# Patient Record
Sex: FEMALE | Race: BLACK OR AFRICAN AMERICAN | NOT HISPANIC OR LATINO | Employment: FULL TIME | URBAN - METROPOLITAN AREA
[De-identification: names, ages, dates, MRNs, and addresses within clinical notes are randomized per-mention and may not be internally consistent; named-entity substitution may affect disease eponyms.]

---

## 2020-07-01 ENCOUNTER — HOSPITAL ENCOUNTER (EMERGENCY)
Facility: OTHER | Age: 35
Discharge: HOME | End: 2020-07-01
Attending: EMERGENCY MEDICINE

## 2020-07-01 VITALS
HEIGHT: 64 IN | HEART RATE: 84 BPM | BODY MASS INDEX: 23.9 KG/M2 | WEIGHT: 140 LBS | TEMPERATURE: 98 F | RESPIRATION RATE: 18 BRPM | OXYGEN SATURATION: 99 % | DIASTOLIC BLOOD PRESSURE: 99 MMHG | SYSTOLIC BLOOD PRESSURE: 142 MMHG

## 2020-07-01 DIAGNOSIS — R51.9 NONINTRACTABLE HEADACHE, UNSPECIFIED CHRONICITY PATTERN, UNSPECIFIED HEADACHE TYPE: Primary | ICD-10-CM

## 2020-07-01 LAB
B-HCG UR QL: NEGATIVE
CTP QC/QA: YES

## 2020-07-01 PROCEDURE — 99284 EMERGENCY DEPT VISIT MOD MDM: CPT | Mod: 25

## 2020-07-01 PROCEDURE — 81025 URINE PREGNANCY TEST: CPT | Performed by: EMERGENCY MEDICINE

## 2020-07-01 PROCEDURE — 63600175 PHARM REV CODE 636 W HCPCS: Performed by: EMERGENCY MEDICINE

## 2020-07-01 PROCEDURE — 25000003 PHARM REV CODE 250: Performed by: EMERGENCY MEDICINE

## 2020-07-01 PROCEDURE — 96372 THER/PROPH/DIAG INJ SC/IM: CPT

## 2020-07-01 RX ORDER — METOCLOPRAMIDE HYDROCHLORIDE 5 MG/ML
10 INJECTION INTRAMUSCULAR; INTRAVENOUS
Status: COMPLETED | OUTPATIENT
Start: 2020-07-01 | End: 2020-07-01

## 2020-07-01 RX ORDER — BUTALBITAL, ACETAMINOPHEN AND CAFFEINE 50; 325; 40 MG/1; MG/1; MG/1
1 TABLET ORAL EVERY 4 HOURS PRN
Qty: 15 TABLET | Refills: 0 | Status: SHIPPED | OUTPATIENT
Start: 2020-07-01 | End: 2020-07-31

## 2020-07-01 RX ORDER — BUTALBITAL, ACETAMINOPHEN AND CAFFEINE 50; 325; 40 MG/1; MG/1; MG/1
1 TABLET ORAL
Status: COMPLETED | OUTPATIENT
Start: 2020-07-01 | End: 2020-07-01

## 2020-07-01 RX ORDER — IBUPROFEN 600 MG/1
600 TABLET ORAL EVERY 6 HOURS PRN
Qty: 20 TABLET | Refills: 0 | Status: SHIPPED | OUTPATIENT
Start: 2020-07-01 | End: 2024-01-09

## 2020-07-01 RX ADMIN — BUTALBITAL, ACETAMINOPHEN, AND CAFFEINE 1 TABLET: 50; 325; 40 TABLET ORAL at 08:07

## 2020-07-01 RX ADMIN — METOCLOPRAMIDE 10 MG: 5 INJECTION, SOLUTION INTRAMUSCULAR; INTRAVENOUS at 07:07

## 2020-07-01 NOTE — Clinical Note
Duy Cisneros was seen and treated in our emergency department on 7/1/2020.  She may return to work on 07/02/2020.       If you have any questions or concerns, please don't hesitate to call.      Jj Csatro, DO

## 2020-07-01 NOTE — Clinical Note
Duy Cisneros was seen and treated in our emergency department on 7/1/2020.  She may return to work on 07/02/2020.       If you have any questions or concerns, please don't hesitate to call.      Jj Castro, DO

## 2020-07-01 NOTE — ED PROVIDER NOTES
"Encounter Date: 7/1/2020    SCRIBE #1 NOTE: I, Romelia Flaco, am scribing for, and in the presence of, Dr. Castro.       History     Chief Complaint   Patient presents with    Headache     pt with left side temporal area with  achey pain that goes down to jaw.  x 3 days.  pain increased today.     Time seen by provider: 7:24 AM    This is a 35 y.o. female who presents with complaint of left-sided headache for the past 2 days. She states that onset was sudden and that pain begins at the left forehead/temple region with radiation down along the left jaw. She states that pain is "throbbing" and rates it at a 9/10 severity. Pain is worse with bright light and when she stands up. She notes pain behind her left eye but denies any eye drainage, blurry vision, or change in vision. She denies any associated N/V, dizziness, or loss of consciousness. She denies any recent fever, cough, nasal congestion, rhinorrhea, shortness of breath, or chest pain. She has tried ice and extra-strength Tylenol without relief. She's never had a headache like this before and denies any history of headaches. She also reports recent, mild palpitations but attributes this to her history of anxiety. She does not have any PSHx or major medical history. She does not take daily medications. She does not take birth control. Her LMP was at the beginning of last month. She does not believe she is pregnant. She denies additional complaints at this time.    The history is provided by the patient.     Review of patient's allergies indicates:  No Known Allergies  No past medical history on file.  No past surgical history on file.  No family history on file.  Social History     Tobacco Use    Smoking status: Not on file   Substance Use Topics    Alcohol use: Not on file    Drug use: Not on file     Review of Systems   Constitutional: Negative for chills, diaphoresis and fever.   HENT: Negative for congestion (nasal) and rhinorrhea.         Positive for " left jaw pain.   Eyes: Positive for photophobia. Negative for discharge.        Negative for blurry vision or change in vision.   Respiratory: Negative for cough and shortness of breath.    Cardiovascular: Positive for palpitations (when anxious). Negative for chest pain.   Gastrointestinal: Negative for nausea and vomiting.   Musculoskeletal: Negative for back pain.   Skin: Negative for rash.   Neurological: Positive for headaches (left). Negative for dizziness and syncope.   Hematological: Does not bruise/bleed easily.   All other systems reviewed and are negative.      Physical Exam     Initial Vitals [07/01/20 0719]   BP Pulse Resp Temp SpO2   (!) 142/99 84 18 98.2 °F (36.8 °C) 99 %      MAP       --         Physical Exam    Nursing note and vitals reviewed.  Constitutional: She appears well-developed and well-nourished.  Non-toxic appearance. She does not have a sickly appearance. No distress.   HENT:   Head: Normocephalic and atraumatic.   Nose: Nose normal.   Mouth/Throat: Oropharynx is clear and moist.   Tenderness to palpation over the left preauricular ramus and left angle of mandible. No swelling.   Eyes: Conjunctivae, EOM and lids are normal. Pupils are equal, round, and reactive to light. Right eye exhibits no nystagmus. Left eye exhibits no nystagmus.   Neck: Trachea normal, normal range of motion and phonation normal. Neck supple. No stridor present.   Cardiovascular: Normal rate, regular rhythm, normal heart sounds and normal pulses. Exam reveals no gallop and no friction rub.    No murmur heard.  Pulmonary/Chest: Breath sounds normal. No respiratory distress. She has no wheezes. She has no rhonchi. She has no rales.   Abdominal: Soft. Normal appearance and bowel sounds are normal. She exhibits no distension. There is no abdominal tenderness. There is no rigidity, no rebound, no guarding, no tenderness at McBurney's point and negative Fabian's sign.   Musculoskeletal: No tenderness or edema.    Neurological: She is alert and oriented to person, place, and time. She has normal strength and normal reflexes. She displays normal reflexes. No cranial nerve deficit or sensory deficit. She displays a negative Romberg sign. GCS score is 15. GCS eye subscore is 4. GCS verbal subscore is 5. GCS motor subscore is 6.   Skin: Skin is warm, dry and intact. Capillary refill takes less than 2 seconds. No rash noted. No pallor.   Psychiatric: Her speech is normal and behavior is normal.         ED Course   Procedures  Labs Reviewed   POCT URINE PREGNANCY - Normal             Medical Decision Making:   History:   Old Medical Records: I decided to obtain old medical records.  Initial Assessment:   I believe the patient has an unspecified headache based upon the history and physical exam.  Likely musculoskeletal.  The patient has no focal weakness, numbness, meningismus, other focal neurologic deficit, history of trauma, fevers, elevated blood pressure to suggest meningitis, subarachnoid hemorrhage, TIA, stroke, mass, or hypertensive urgency.  Based on her age I do not suspect temporal arteritis.  Possible trigeminal neuralgia.  No rash to suggest shingles.  I do not feel a CT of the brain or blood work are necessary at this time. I will treat the patient with over-the-counter medications and have them follow up with their regular doctor.  Symptoms were improved upon discharge.    This is the extent to the patients complaints today here in the emergency department.  Clinical Tests:   Lab Tests: Ordered and Reviewed            Scribe Attestation:   Scribe #1: I performed the above scribed service and the documentation accurately describes the services I performed. I attest to the accuracy of the note.    Attending Attestation:           Physician Attestation for Scribe:  Physician Attestation Statement for Scribe #1: I, Dr. Castro, reviewed documentation, as scribed by Romelia Sam in my presence, and it is both accurate  and complete.                 ED Course as of Jul 01 0833 Wed Jul 01, 2020   0810 On re-evaluation, patient is resting in hospital bed with lights off. She states that headache is unchanged.    [SF]   0832 Upon re-evaluation patient is feeling better.  States her pain is now 6 or 7/10.  Feels it is tolerable.  She is resting comfortably.  Recheck of her neurological system shows no acute abnormalities and a normal exam.  Do not feel any further workup is indicated here.  Will discharge with analgesic medications to follow-up with primary care with with strict return precautions given.    Patient discharged home in stable condition. Diagnosis and treatment plan explained to patient and/or family member who is at bedside. I have answered all questions and the patient is satisfied with the plan of care. Strict return precautions given. The patient demonstrates understanding of the care plan. This is the extent to the patients complaints today here in the emergency department.    [SM]      ED Course User Index  [SF] Romelia Flaco  [SM] Jj Castro DO                Clinical Impression:     1. Nonintractable headache, unspecified chronicity pattern, unspecified headache type                ED Disposition Condition    Discharge Stable        ED Prescriptions     Medication Sig Dispense Start Date End Date Auth. Provider    butalbital-acetaminophen-caffeine -40 mg (FIORICET, ESGIC) -40 mg per tablet Take 1 tablet by mouth every 4 (four) hours as needed for Pain. 15 tablet 7/1/2020 7/31/2020 Jj Castro DO    ibuprofen (ADVIL,MOTRIN) 600 MG tablet Take 1 tablet (600 mg total) by mouth every 6 (six) hours as needed for Pain. 20 tablet 7/1/2020  Jj Castro DO        Follow-up Information     Follow up With Specialties Details Why Contact Info    [facility] Shadia Escobar  Schedule an appointment as soon as possible for a visit   [address] [facility]  [address]  [phone]      [facility], [place], operated by [facility]  If symptoms worsen [address] [facility] [id number] [phone]                                     Jj Castro,   07/01/20 0833

## 2024-01-09 ENCOUNTER — HOSPITAL ENCOUNTER (INPATIENT)
Facility: HOSPITAL | Age: 39
LOS: 1 days | Discharge: HOME | DRG: 812 | End: 2024-01-10
Attending: EMERGENCY MEDICINE | Admitting: INTERNAL MEDICINE

## 2024-01-09 DIAGNOSIS — D57.1 SICKLE CELL ANEMIA WITHOUT CRISIS: ICD-10-CM

## 2024-01-09 DIAGNOSIS — D64.9 ANEMIA, UNSPECIFIED TYPE: ICD-10-CM

## 2024-01-09 DIAGNOSIS — F41.9 ANXIETY: ICD-10-CM

## 2024-01-09 DIAGNOSIS — D25.9 UTERINE LEIOMYOMA, UNSPECIFIED LOCATION: ICD-10-CM

## 2024-01-09 DIAGNOSIS — R06.02 SHORTNESS OF BREATH: ICD-10-CM

## 2024-01-09 DIAGNOSIS — R07.9 CHEST PAIN: ICD-10-CM

## 2024-01-09 DIAGNOSIS — D50.9 MICROCYTIC HYPOCHROMIC ANEMIA: ICD-10-CM

## 2024-01-09 DIAGNOSIS — D69.6 THROMBOCYTOPENIA: ICD-10-CM

## 2024-01-09 DIAGNOSIS — D57.3 SICKLE CELL TRAIT: ICD-10-CM

## 2024-01-09 DIAGNOSIS — D64.9 SYMPTOMATIC ANEMIA: Primary | ICD-10-CM

## 2024-01-09 DIAGNOSIS — E53.8 B12 DEFICIENCY: ICD-10-CM

## 2024-01-09 LAB
ABO + RH BLD: NORMAL
ALBUMIN SERPL BCP-MCNC: 4.4 G/DL (ref 3.5–5.2)
ALP SERPL-CCNC: 60 U/L (ref 55–135)
ALT SERPL W/O P-5'-P-CCNC: 15 U/L (ref 10–44)
ANION GAP SERPL CALC-SCNC: 5 MMOL/L (ref 8–16)
AST SERPL-CCNC: 15 U/L (ref 10–40)
B-HCG UR QL: NEGATIVE
BASOPHILS # BLD AUTO: 0.03 K/UL (ref 0–0.2)
BASOPHILS NFR BLD: 0.5 % (ref 0–1.9)
BILIRUB SERPL-MCNC: 0.4 MG/DL (ref 0.1–1)
BLD GP AB SCN CELLS X3 SERPL QL: NORMAL
BNP SERPL-MCNC: 40 PG/ML (ref 0–99)
BUN SERPL-MCNC: 13 MG/DL (ref 6–20)
CALCIUM SERPL-MCNC: 9.2 MG/DL (ref 8.7–10.5)
CHLORIDE SERPL-SCNC: 107 MMOL/L (ref 95–110)
CO2 SERPL-SCNC: 24 MMOL/L (ref 23–29)
CREAT SERPL-MCNC: 0.7 MG/DL (ref 0.5–1.4)
CTP QC/QA: YES
DIFFERENTIAL METHOD BLD: ABNORMAL
EOSINOPHIL # BLD AUTO: 0 K/UL (ref 0–0.5)
EOSINOPHIL NFR BLD: 0.7 % (ref 0–8)
ERYTHROCYTE [DISTWIDTH] IN BLOOD BY AUTOMATED COUNT: 24.7 % (ref 11.5–14.5)
ERYTHROCYTE [SEDIMENTATION RATE] IN BLOOD BY WESTERGREN METHOD: 3 MM/HR (ref 0–20)
EST. GFR  (NO RACE VARIABLE): >60 ML/MIN/1.73 M^2
FERRITIN SERPL-MCNC: 1.3 NG/ML (ref 20–300)
FOLATE SERPL-MCNC: 8 NG/ML (ref 4–24)
GLUCOSE SERPL-MCNC: 99 MG/DL (ref 70–110)
HCT VFR BLD AUTO: 25 % (ref 37–48.5)
HGB BLD-MCNC: 6 G/DL (ref 12–16)
HGB S BLD QL SOLY: POSITIVE
IMM GRANULOCYTES # BLD AUTO: 0.02 K/UL (ref 0–0.04)
IMM GRANULOCYTES NFR BLD AUTO: 0.3 % (ref 0–0.5)
IRON SERPL-MCNC: 14 UG/DL (ref 30–160)
LYMPHOCYTES # BLD AUTO: 1.9 K/UL (ref 1–4.8)
LYMPHOCYTES NFR BLD: 30.7 % (ref 18–48)
MCH RBC QN AUTO: 15.1 PG (ref 27–31)
MCHC RBC AUTO-ENTMCNC: 24 G/DL (ref 32–36)
MCV RBC AUTO: 63 FL (ref 82–98)
MONOCYTES # BLD AUTO: 0.5 K/UL (ref 0.3–1)
MONOCYTES NFR BLD: 8.7 % (ref 4–15)
NEUTROPHILS # BLD AUTO: 3.6 K/UL (ref 1.8–7.7)
NEUTROPHILS NFR BLD: 59.1 % (ref 38–73)
NRBC BLD-RTO: 0 /100 WBC
PLATELET # BLD AUTO: 117 K/UL (ref 150–450)
PMV BLD AUTO: ABNORMAL FL (ref 9.2–12.9)
POTASSIUM SERPL-SCNC: 3.8 MMOL/L (ref 3.5–5.1)
PROT SERPL-MCNC: 7.5 G/DL (ref 6–8.4)
RBC # BLD AUTO: 3.97 M/UL (ref 4–5.4)
SATURATED IRON: 2 % (ref 20–50)
SODIUM SERPL-SCNC: 136 MMOL/L (ref 136–145)
SPECIMEN OUTDATE: NORMAL
T4 FREE SERPL-MCNC: 0.76 NG/DL (ref 0.71–1.51)
TOTAL IRON BINDING CAPACITY: 771 UG/DL (ref 250–450)
TRANSFERRIN SERPL-MCNC: 551 MG/DL (ref 200–375)
VIT B12 SERPL-MCNC: 218 PG/ML (ref 210–950)
WBC # BLD AUTO: 6.1 K/UL (ref 3.9–12.7)

## 2024-01-09 PROCEDURE — 93005 ELECTROCARDIOGRAM TRACING: CPT | Performed by: GENERAL PRACTICE

## 2024-01-09 PROCEDURE — 85660 RBC SICKLE CELL TEST: CPT | Performed by: INTERNAL MEDICINE

## 2024-01-09 PROCEDURE — 36415 COLL VENOUS BLD VENIPUNCTURE: CPT | Performed by: INTERNAL MEDICINE

## 2024-01-09 PROCEDURE — 86901 BLOOD TYPING SEROLOGIC RH(D): CPT | Performed by: STUDENT IN AN ORGANIZED HEALTH CARE EDUCATION/TRAINING PROGRAM

## 2024-01-09 PROCEDURE — 96374 THER/PROPH/DIAG INJ IV PUSH: CPT

## 2024-01-09 PROCEDURE — 93010 ELECTROCARDIOGRAM REPORT: CPT | Mod: ,,, | Performed by: GENERAL PRACTICE

## 2024-01-09 PROCEDURE — 36430 TRANSFUSION BLD/BLD COMPNT: CPT

## 2024-01-09 PROCEDURE — G0378 HOSPITAL OBSERVATION PER HR: HCPCS

## 2024-01-09 PROCEDURE — 30000890 LABCORP MISCELLANEOUS TEST: Performed by: INTERNAL MEDICINE

## 2024-01-09 PROCEDURE — 83020 HEMOGLOBIN ELECTROPHORESIS: CPT | Performed by: INTERNAL MEDICINE

## 2024-01-09 PROCEDURE — 25000003 PHARM REV CODE 250: Performed by: INTERNAL MEDICINE

## 2024-01-09 PROCEDURE — 85025 COMPLETE CBC W/AUTO DIFF WBC: CPT | Performed by: NURSE PRACTITIONER

## 2024-01-09 PROCEDURE — 99285 EMERGENCY DEPT VISIT HI MDM: CPT | Mod: 25

## 2024-01-09 PROCEDURE — 86920 COMPATIBILITY TEST SPIN: CPT | Performed by: INTERNAL MEDICINE

## 2024-01-09 PROCEDURE — 83880 ASSAY OF NATRIURETIC PEPTIDE: CPT | Performed by: NURSE PRACTITIONER

## 2024-01-09 PROCEDURE — 83540 ASSAY OF IRON: CPT | Performed by: STUDENT IN AN ORGANIZED HEALTH CARE EDUCATION/TRAINING PROGRAM

## 2024-01-09 PROCEDURE — 82607 VITAMIN B-12: CPT | Performed by: INTERNAL MEDICINE

## 2024-01-09 PROCEDURE — 80053 COMPREHEN METABOLIC PANEL: CPT | Performed by: NURSE PRACTITIONER

## 2024-01-09 PROCEDURE — 84439 ASSAY OF FREE THYROXINE: CPT | Performed by: INTERNAL MEDICINE

## 2024-01-09 PROCEDURE — 82746 ASSAY OF FOLIC ACID SERUM: CPT | Performed by: INTERNAL MEDICINE

## 2024-01-09 PROCEDURE — 82728 ASSAY OF FERRITIN: CPT | Performed by: STUDENT IN AN ORGANIZED HEALTH CARE EDUCATION/TRAINING PROGRAM

## 2024-01-09 PROCEDURE — 85651 RBC SED RATE NONAUTOMATED: CPT | Performed by: INTERNAL MEDICINE

## 2024-01-09 PROCEDURE — 63600175 PHARM REV CODE 636 W HCPCS: Performed by: STUDENT IN AN ORGANIZED HEALTH CARE EDUCATION/TRAINING PROGRAM

## 2024-01-09 PROCEDURE — 81025 URINE PREGNANCY TEST: CPT | Performed by: NURSE PRACTITIONER

## 2024-01-09 RX ORDER — NALOXONE HCL 0.4 MG/ML
0.02 VIAL (ML) INJECTION
Status: DISCONTINUED | OUTPATIENT
Start: 2024-01-09 | End: 2024-01-10 | Stop reason: HOSPADM

## 2024-01-09 RX ORDER — PROCHLORPERAZINE EDISYLATE 5 MG/ML
5 INJECTION INTRAMUSCULAR; INTRAVENOUS EVERY 6 HOURS PRN
Status: DISCONTINUED | OUTPATIENT
Start: 2024-01-09 | End: 2024-01-10 | Stop reason: HOSPADM

## 2024-01-09 RX ORDER — HYDROCODONE BITARTRATE AND ACETAMINOPHEN 500; 5 MG/1; MG/1
TABLET ORAL
Status: DISCONTINUED | OUTPATIENT
Start: 2024-01-09 | End: 2024-01-09

## 2024-01-09 RX ORDER — GLUCAGON 1 MG
1 KIT INJECTION
Status: DISCONTINUED | OUTPATIENT
Start: 2024-01-09 | End: 2024-01-10 | Stop reason: HOSPADM

## 2024-01-09 RX ORDER — LORAZEPAM 1 MG/1
1 TABLET ORAL EVERY 6 HOURS PRN
Status: DISCONTINUED | OUTPATIENT
Start: 2024-01-10 | End: 2024-01-10 | Stop reason: HOSPADM

## 2024-01-09 RX ORDER — LORAZEPAM 2 MG/ML
1 INJECTION INTRAMUSCULAR
Status: COMPLETED | OUTPATIENT
Start: 2024-01-09 | End: 2024-01-09

## 2024-01-09 RX ORDER — IBUPROFEN 200 MG
16 TABLET ORAL
Status: DISCONTINUED | OUTPATIENT
Start: 2024-01-09 | End: 2024-01-10 | Stop reason: HOSPADM

## 2024-01-09 RX ORDER — ONDANSETRON 2 MG/ML
4 INJECTION INTRAMUSCULAR; INTRAVENOUS EVERY 8 HOURS PRN
Status: DISCONTINUED | OUTPATIENT
Start: 2024-01-09 | End: 2024-01-10 | Stop reason: HOSPADM

## 2024-01-09 RX ORDER — HYDROCODONE BITARTRATE AND ACETAMINOPHEN 500; 5 MG/1; MG/1
TABLET ORAL
Status: DISCONTINUED | OUTPATIENT
Start: 2024-01-09 | End: 2024-01-10 | Stop reason: HOSPADM

## 2024-01-09 RX ORDER — SODIUM CHLORIDE 0.9 % (FLUSH) 0.9 %
10 SYRINGE (ML) INJECTION EVERY 12 HOURS PRN
Status: DISCONTINUED | OUTPATIENT
Start: 2024-01-09 | End: 2024-01-10 | Stop reason: HOSPADM

## 2024-01-09 RX ORDER — IBUPROFEN 200 MG
24 TABLET ORAL
Status: DISCONTINUED | OUTPATIENT
Start: 2024-01-09 | End: 2024-01-10 | Stop reason: HOSPADM

## 2024-01-09 RX ORDER — DIPHENHYDRAMINE HCL 25 MG
25 CAPSULE ORAL EVERY 6 HOURS PRN
Status: DISCONTINUED | OUTPATIENT
Start: 2024-01-09 | End: 2024-01-10 | Stop reason: HOSPADM

## 2024-01-09 RX ORDER — ACETAMINOPHEN 325 MG/1
650 TABLET ORAL EVERY 4 HOURS PRN
Status: DISCONTINUED | OUTPATIENT
Start: 2024-01-09 | End: 2024-01-10 | Stop reason: HOSPADM

## 2024-01-09 RX ADMIN — LORAZEPAM 1 MG: 2 INJECTION INTRAMUSCULAR; INTRAVENOUS at 03:01

## 2024-01-09 RX ADMIN — DIPHENHYDRAMINE HYDROCHLORIDE 25 MG: 25 CAPSULE ORAL at 10:01

## 2024-01-09 RX ADMIN — ACETAMINOPHEN 650 MG: 325 TABLET ORAL at 10:01

## 2024-01-09 NOTE — ASSESSMENT & PLAN NOTE
Patient's anemia is currently uncontrolled. Has not received any PRBCs to date. Etiology likely d/t  unsure  Current CBC reviewed-   Lab Results   Component Value Date    HGB 6.0 (LL) 01/09/2024    HCT 25.0 (L) 01/09/2024     Monitor serial CBC and transfuse if patient becomes hemodynamically unstable, symptomatic or H/H drops below 7/21.

## 2024-01-09 NOTE — SUBJECTIVE & OBJECTIVE
No past medical history on file.    No past surgical history on file.    Review of patient's allergies indicates:  No Known Allergies    No current facility-administered medications on file prior to encounter.     Current Outpatient Medications on File Prior to Encounter   Medication Sig    [DISCONTINUED] ibuprofen (ADVIL,MOTRIN) 600 MG tablet Take 1 tablet (600 mg total) by mouth every 6 (six) hours as needed for Pain.     Family History    None       Tobacco Use    Smoking status: Not on file    Smokeless tobacco: Not on file   Substance and Sexual Activity    Alcohol use: Not on file    Drug use: Not on file    Sexual activity: Not on file     Review of Systems   Constitutional:  Positive for activity change, diaphoresis and fatigue.   HENT: Negative.     Eyes: Negative.    Respiratory:          GUILLEN   Cardiovascular: Negative.    Gastrointestinal: Negative.    Endocrine: Negative.    Genitourinary: Negative.    Musculoskeletal:  Positive for arthralgias.   Skin: Negative.    Allergic/Immunologic: Negative.    Neurological:  Positive for weakness.   Hematological:  Bruises/bleeds easily.   Psychiatric/Behavioral:  The patient is nervous/anxious.      Objective:     Vital Signs (Most Recent):  Temp: 98.3 °F (36.8 °C) (01/09/24 1126)  Pulse: 75 (01/09/24 1501)  Resp: 19 (01/09/24 1126)  BP: (!) 163/81 (01/09/24 1501)  SpO2: 100 % (01/09/24 1501) Vital Signs (24h Range):  Temp:  [98.3 °F (36.8 °C)] 98.3 °F (36.8 °C)  Pulse:  [75-94] 75  Resp:  [19] 19  SpO2:  [98 %-100 %] 100 %  BP: (157-163)/(81-88) 163/81     Weight: 65.8 kg (145 lb)  Body mass index is 26.52 kg/m².     Physical Exam  Vitals and nursing note reviewed.   Constitutional:       General: She is not in acute distress.     Appearance: She is not ill-appearing.   HENT:      Head: Normocephalic and atraumatic.      Nose: Nose normal.      Mouth/Throat:      Mouth: Mucous membranes are moist.   Eyes:      Extraocular Movements: Extraocular movements intact.       Pupils: Pupils are equal, round, and reactive to light.      Comments: Pale conjunctiva   Cardiovascular:      Rate and Rhythm: Normal rate and regular rhythm.      Heart sounds: Murmur heard.      Gallop present.   Abdominal:      General: Bowel sounds are normal. There is no distension.      Tenderness: There is no abdominal tenderness. There is no guarding or rebound.   Musculoskeletal:         General: Normal range of motion.      Cervical back: Normal range of motion and neck supple.   Skin:     General: Skin is warm.   Neurological:      Mental Status: She is alert and oriented to person, place, and time.   Psychiatric:      Comments: anxious              CRANIAL NERVES     CN III, IV, VI   Pupils are equal, round, and reactive to light.       Significant Labs: All pertinent labs within the past 24 hours have been reviewed.  Recent Lab Results         01/09/24  1238   01/09/24  1232        Albumin 4.4         ALP 60         ALT 15         Anion Gap 5         AST 15         Baso # 0.03         Basophil % 0.5         BILIRUBIN TOTAL 0.4  Comment: For infants and newborns, interpretation of results should be based  on gestational age, weight and in agreement with clinical  observations.    Premature Infant recommended reference ranges:  Up to 24 hours.............<8.0 mg/dL  Up to 48 hours............<12.0 mg/dL  3-5 days..................<15.0 mg/dL  6-29 days.................<15.0 mg/dL           BNP 40  Comment: Values of less than 100 pg/ml are consistent with non-CHF populations.         BUN 13         Calcium 9.2         Chloride 107         CO2 24         Creatinine 0.7         Differential Method Automated         eGFR >60.0         Eos # 0.0         Eosinophil % 0.7         Glucose 99         Gran # (ANC) 3.6         Gran % 59.1         Hematocrit 25.0         Hemoglobin 6.0  Comment: Hgb critical result(s) called and verbal readback obtained from   Martha Cabrales RN ED by MPNatasha 01/09/2024 13:33            Immature Grans (Abs) 0.02  Comment: Mild elevation in immature granulocytes is non specific and   can be seen in a variety of conditions including stress response,   acute inflammation, trauma and pregnancy. Correlation with other   laboratory and clinical findings is essential.           Immature Granulocytes 0.3         Lymph # 1.9         Lymph % 30.7         MCH 15.1         MCHC 24.0         MCV 63         Mono # 0.5         Mono % 8.7         MPV SEE COMMENT  Comment: Result not available.         nRBC 0         Platelet Count 117         Potassium 3.8         Preg Test, Ur   Negative       PROTEIN TOTAL 7.5          Acceptable   Yes       RBC 3.97         RDW 24.7         Sodium 136         WBC 6.10                 Significant Imaging: I have reviewed all pertinent imaging results/findings within the past 24 hours.

## 2024-01-09 NOTE — LETTER
January 10, 2024         [address]  [facility] [address]  Phone: [phone]  Fax: [phone]       Patient: Duy Cisneros   YOB: 1985  Date of Visit: 01/10/2024    To Whom It May Concern:    Eliceo Cisneros  was at [facility] on 01/09/2024-01/10/2024. The patient may return to work/school on 01/15/2024 with no restrictions. If you have any questions or concerns, or if I can be of further assistance, please do not hesitate to contact me.    Sincerely,    Malka Verde NP

## 2024-01-09 NOTE — ED PROVIDER NOTES
Encounter Date: 1/9/2024       History     Chief Complaint   Patient presents with    Dizziness     C/o dizziness, sob with exertion. Onset intermittent x 1 mth     30-year-old female no significant past medical history presents emergency room for evaluation of 1 month of intermittent dizziness, shortness breath and tachycardia/palpitations with exertion.  Tells me that she has shortness breath walking up and down stairs, with significant activity.  She works in a warehouse.  Also complains of generalized fatigue, feeling cold at home despite having the heat on.  Tells me dizziness is usually positional.  Denies syncope.  Denies chest pain.  No shortness a breath at rest.  No change in menstrual period, cycle or flow.  She tells me that she has always had heavy flow.  No history of anemia.  Not currently taking any medications.  Denies alcohol, tobacco or illicit drug use.    The history is provided by the patient. No  was used.     Review of patient's allergies indicates:  No Known Allergies  No past medical history on file.  No past surgical history on file.  No family history on file.     Review of Systems   Constitutional:  Positive for fatigue. Negative for chills and fever.   HENT:  Negative for congestion, hearing loss, nosebleeds, sore throat and trouble swallowing.    Eyes:  Negative for visual disturbance.   Respiratory:  Positive for shortness of breath. Negative for cough and chest tightness.    Cardiovascular:  Positive for palpitations. Negative for chest pain.   Gastrointestinal:  Negative for abdominal distention, abdominal pain, anal bleeding, blood in stool, constipation, diarrhea, nausea and vomiting.   Endocrine: Negative for polyuria.   Genitourinary:  Negative for difficulty urinating, menstrual problem, pelvic pain, vaginal bleeding, vaginal discharge and vaginal pain.   Musculoskeletal:  Negative for arthralgias and myalgias.   Skin:  Negative for rash.   Neurological:   Positive for dizziness. Negative for headaches.   Psychiatric/Behavioral:  The patient is not nervous/anxious.        Physical Exam     Initial Vitals [01/09/24 1126]   BP Pulse Resp Temp SpO2   (!) 157/88 94 19 98.3 °F (36.8 °C) 98 %      MAP       --         Physical Exam    Nursing note and vitals reviewed.  Constitutional: She appears well-developed and well-nourished.   HENT:   Head: Normocephalic and atraumatic.   Pale conjunctiva throughout   Eyes: Conjunctivae are normal. Pupils are equal, round, and reactive to light.   Neck: Neck supple.   Normal range of motion.  Cardiovascular:  Normal rate, regular rhythm, normal heart sounds and intact distal pulses.     Exam reveals no gallop and no friction rub.       No murmur heard.  Pulmonary/Chest: Breath sounds normal. No respiratory distress.   Abdominal: Abdomen is soft. She exhibits no distension. There is no abdominal tenderness.   Musculoskeletal:         General: Normal range of motion.      Cervical back: Normal range of motion and neck supple.     Neurological: She is alert and oriented to person, place, and time. GCS score is 15. GCS eye subscore is 4. GCS verbal subscore is 5. GCS motor subscore is 6.   Skin: Skin is warm and dry. Capillary refill takes less than 2 seconds. There is pallor.   Psychiatric: She has a normal mood and affect. Her behavior is normal. Judgment and thought content normal.         ED Course   Critical Care    Date/Time: 1/9/2024 3:24 PM    Performed by: Vimal Krueger PA-C  Authorized by: Ulices Alvarez DO  Direct patient critical care time: 5 minutes  Additional history critical care time: 5 minutes  Ordering / reviewing critical care time: 10 minutes  Documentation critical care time: 5 minutes  Consulting other physicians critical care time: 5 minutes  Total critical care time (exclusive of procedural time) : 30 minutes  Critical care time was exclusive of separately billable procedures and treating other patients and  teaching time.  Critical care was necessary to treat or prevent imminent or life-threatening deterioration of the following conditions: cardiac failure, dehydration, circulatory failure and respiratory failure.  Critical care was time spent personally by me on the following activities: development of treatment plan with patient or surrogate, discussions with consultants, evaluation of patient's response to treatment, examination of patient, ordering and review of laboratory studies, ordering and review of radiographic studies, pulse oximetry and re-evaluation of patient's condition.        Labs Reviewed   CBC W/ AUTO DIFFERENTIAL - Abnormal; Notable for the following components:       Result Value    RBC 3.97 (*)     Hemoglobin 6.0 (*)     Hematocrit 25.0 (*)     MCV 63 (*)     MCH 15.1 (*)     MCHC 24.0 (*)     RDW 24.7 (*)     Platelets 117 (*)     All other components within normal limits    Narrative:       Hgb critical result(s) called and verbal readback obtained from   Martha Cabrales RN ED by MP4 01/09/2024 13:33   COMPREHENSIVE METABOLIC PANEL - Abnormal; Notable for the following components:    Anion Gap 5 (*)     All other components within normal limits   B-TYPE NATRIURETIC PEPTIDE   IRON AND TIBC   FERRITIN   VITAMIN B12   FOLATE   SEDIMENTATION RATE   POCT URINE PREGNANCY   TYPE & SCREEN   PREPARE RBC SOFT        ECG Results              EKG 12-lead (In process)  Result time 01/09/24 11:37:33      In process by Interface, Lab In [facility] (01/09/24 11:37:33)                   Narrative:    Test Reason : R06.02,    Vent. Rate : 081 BPM     Atrial Rate : 081 BPM     P-R Int : 136 ms          QRS Dur : 072 ms      QT Int : 392 ms       P-R-T Axes : 068 063 057 degrees     QTc Int : 455 ms    Normal sinus rhythm  Normal ECG  No previous ECGs available    Referred By: [email or web address]   SELF           Confirmed By:                                   Imaging Results              X-Ray Chest AP (Final result)  Result time  01/09/24 13:16:41      Final result by Migel Duarte MD (01/09/24 13:16:41)                   Narrative:    HISTORY: shortness of breath    FINDINGS: Portable chest radiograph at 1305 hours with no prior studies for comparison shows the cardiomediastinal silhouette and pulmonary vasculature are within normal limits.    The lungs are normally expanded, with no consolidation, large pleural effusion, or evidence of pulmonary edema. No confluent infiltrates or pneumothorax. There are no significant osseous abnormalities.    IMPRESSION: No evidence of active cardiopulmonary disease.    Electronically signed by:  Migel Duarte MD  01/09/2024 01:16 PM [facility] Workstation: [id number]                                     Medications   0.9%  NaCl infusion (for blood administration) (has no administration in time range)   LORazepam injection 1 mg (1 mg Intravenous Given 1/9/24 1525)     Medical Decision Making  Patient presents for emergent evaluation of dyspnea. Workup today consistent with likely symptomatic anemia.  Differential includes CHF, COPD, pneumonia, pneumothorax, esophageal rupture, ACS, anemia, COVID, flu.  Patient is nontoxic and well appearing, Afebrile with stable vital signs.  She does demonstrate pallor.  Labs were ordered and documented in the ED course.  Notable for anemia, thrombocytopenia.  Imaging was ordered and documented in the ED course.  Chest x-ray unremarkable.  EKG nonischemic.  I discussed patient case with consultant Dr. Lua, who agrees to evaluate for admission.     The treatment they received in the emergency department included transfuse RBC x1 unit, lorazepam 1 mg for anxiety.  I addressed the following problems in addition to the chief complaint:  Anxiety.      Amount and/or Complexity of Data Reviewed  Labs: ordered. Decision-making details documented in ED Course.  Radiology:  Decision-making details documented in ED Course.    Risk  Prescription drug management.  Decision regarding  hospitalization.               ED Course as of 01/09/24 1534   Tue Jan 09, 2024   1446 BP(!): 157/88 [AN]   1446 Temp: 98.3 °F (36.8 °C) [AN]   1446 Pulse: 94 [AN]   1446 Resp: 19 [AN]   1446 SpO2: 98 % [AN]   1446 CBC auto differential(!!)  H/H 6/25, MCV 63.   No Leukocytosis.  Thrombocytopenia [AN]   1446 Comprehensive metabolic panel(!)  No significant metabolic abnormality [AN]   1447 POCT urine pregnancy [AN]   1447 X-Ray Chest AP  I reviewed the images as well as the radiologist interpretation.  No evidence of active cardiopulmonary disease. [AN]   1448 EKG independently interpreted by me.  Demonstrates normal sinus rhythm rate of 80 beats per minute.  Normal axis, normal intervals.  No ST elevation, ST depression or T-wave inversion to suggest ischemia.  No STEMI. [AN]   1520 Discussed patient case with Hospital Medicine.  They agree to evaluate for admission. [AN]      ED Course User Index  [AN] Vimal Krueger PA-C                           Clinical Impression:  Final diagnoses:  [R06.02] Shortness of breath  [D64.9] Anemia, unspecified type (Primary)  [F41.9] Anxiety          ED Disposition Condition    Admit Stable                Vimal Krueger PA-C  01/09/24 1528       Vimal Krueger PA-C  01/09/24 1535

## 2024-01-09 NOTE — HPI
"1/9/2024  Ms Cisneros notes sob benson and dizziness for a " minute". She has no Hx of anemia. Pt has had no CBC recorded in care everywhere.  No Hx of anemia with pregnancy or childhood anemia  "

## 2024-01-09 NOTE — FIRST PROVIDER EVALUATION
"Medical screening examination initiated.  I have conducted a focused provider triage encounter, findings are as follows:    Brief history of present illness:  Patient reports dizziness.  Onset approximately 1 week.  She denies chest pain or shortness of breath.    Vitals:    01/09/24 1126   BP: (!) 157/88   BP Location: Left arm   Patient Position: Sitting   Pulse: 94   Resp: 19   Temp: 98.3 °F (36.8 °C)   TempSrc: Oral   SpO2: 98%   Weight: 65.8 kg (145 lb)   Height: 5' 2" (1.575 m)       Pertinent physical exam:  Heart rate regular lungs clear to auscultation.  Patient appears to be in no acute distress.    Brief workup plan:      Preliminary workup initiated; this workup will be continued and followed by the physician or advanced practice provider that is assigned to the patient when roomed.  " no

## 2024-01-09 NOTE — H&P
"  [facility] - Emergency Dept  Hospital Medicine  History & Physical    Patient Name: Duy Cisneros  MRN: [id number]  Patient Class: OP- Observation  Admission Date: 1/9/2024  Attending Physician: Walt Lua MD  Primary Care Provider: Dasia, Primary Doctor         Patient information was obtained from patient, past medical records, and ER records.     Subjective:     Principal Problem:<principal problem not specified>    Chief Complaint:   Chief Complaint   Patient presents with    Dizziness     C/o dizziness, sob with exertion. Onset intermittent x 1 mth        HPI: 1/9/2024  Ms Cisneros notes sob guillen and dizziness for a " minute". She has no Hx of anemia. Pt has had no CBC recorded in care everywhere.  No Hx of anemia with pregnancy or childhood anemia    No past medical history on file.    No past surgical history on file.    Review of patient's allergies indicates:  No Known Allergies    No current facility-administered medications on file prior to encounter.     Current Outpatient Medications on File Prior to Encounter   Medication Sig    [DISCONTINUED] ibuprofen (ADVIL,MOTRIN) 600 MG tablet Take 1 tablet (600 mg total) by mouth every 6 (six) hours as needed for Pain.     Family History    None       Tobacco Use    Smoking status: Not on file    Smokeless tobacco: Not on file   Substance and Sexual Activity    Alcohol use: Not on file    Drug use: Not on file    Sexual activity: Not on file     Review of Systems   Constitutional:  Positive for activity change, diaphoresis and fatigue.   HENT: Negative.     Eyes: Negative.    Respiratory:          GUILLEN   Cardiovascular: Negative.    Gastrointestinal: Negative.    Endocrine: Negative.    Genitourinary: Negative.    Musculoskeletal:  Positive for arthralgias.   Skin: Negative.    Allergic/Immunologic: Negative.    Neurological:  Positive for weakness.   Hematological:  Bruises/bleeds easily.   Psychiatric/Behavioral:  The patient is " nervous/anxious.      Objective:     Vital Signs (Most Recent):  Temp: 98.3 °F (36.8 °C) (01/09/24 1126)  Pulse: 75 (01/09/24 1501)  Resp: 19 (01/09/24 1126)  BP: (!) 163/81 (01/09/24 1501)  SpO2: 100 % (01/09/24 1501) Vital Signs (24h Range):  Temp:  [98.3 °F (36.8 °C)] 98.3 °F (36.8 °C)  Pulse:  [75-94] 75  Resp:  [19] 19  SpO2:  [98 %-100 %] 100 %  BP: (157-163)/(81-88) 163/81     Weight: 65.8 kg (145 lb)  Body mass index is 26.52 kg/m².     Physical Exam  Vitals and nursing note reviewed.   Constitutional:       General: She is not in acute distress.     Appearance: She is not ill-appearing.   HENT:      Head: Normocephalic and atraumatic.      Nose: Nose normal.      Mouth/Throat:      Mouth: Mucous membranes are moist.   Eyes:      Extraocular Movements: Extraocular movements intact.      Pupils: Pupils are equal, round, and reactive to light.      Comments: Pale conjunctiva   Cardiovascular:      Rate and Rhythm: Normal rate and regular rhythm.      Heart sounds: Murmur heard.      Gallop present.   Abdominal:      General: Bowel sounds are normal. There is no distension.      Tenderness: There is no abdominal tenderness. There is no guarding or rebound.   Musculoskeletal:         General: Normal range of motion.      Cervical back: Normal range of motion and neck supple.   Skin:     General: Skin is warm.   Neurological:      Mental Status: She is alert and oriented to person, place, and time.   Psychiatric:      Comments: anxious              CRANIAL NERVES     CN III, IV, VI   Pupils are equal, round, and reactive to light.       Significant Labs: All pertinent labs within the past 24 hours have been reviewed.  Recent Lab Results         01/09/24  1238   01/09/24  1232        Albumin 4.4         ALP 60         ALT 15         Anion Gap 5         AST 15         Baso # 0.03         Basophil % 0.5         BILIRUBIN TOTAL 0.4  Comment: For infants and newborns, interpretation of results should be based  on  gestational age, weight and in agreement with clinical  observations.    Premature Infant recommended reference ranges:  Up to 24 hours.............<8.0 mg/dL  Up to 48 hours............<12.0 mg/dL  3-5 days..................<15.0 mg/dL  6-29 days.................<15.0 mg/dL           BNP 40  Comment: Values of less than 100 pg/ml are consistent with non-CHF populations.         BUN 13         Calcium 9.2         Chloride 107         CO2 24         Creatinine 0.7         Differential Method Automated         eGFR >60.0         Eos # 0.0         Eosinophil % 0.7         Glucose 99         Gran # (ANC) 3.6         Gran % 59.1         Hematocrit 25.0         Hemoglobin 6.0  Comment: Hgb critical result(s) called and verbal readback obtained from   Martha Cabrales RN ED by MP4 01/09/2024 13:33           Immature Grans (Abs) 0.02  Comment: Mild elevation in immature granulocytes is non specific and   can be seen in a variety of conditions including stress response,   acute inflammation, trauma and pregnancy. Correlation with other   laboratory and clinical findings is essential.           Immature Granulocytes 0.3         Lymph # 1.9         Lymph % 30.7         MCH 15.1         MCHC 24.0         MCV 63         Mono # 0.5         Mono % 8.7         MPV SEE COMMENT  Comment: Result not available.         nRBC 0         Platelet Count 117         Potassium 3.8         Preg Test, Ur   Negative       PROTEIN TOTAL 7.5          Acceptable   Yes       RBC 3.97         RDW 24.7         Sodium 136         WBC 6.10                 Significant Imaging: I have reviewed all pertinent imaging results/findings within the past 24 hours.  Assessment/Plan:     Symptomatic anemia  Patient's anemia is currently uncontrolled. Has not received any PRBCs to date. Etiology likely d/t  unsure  Current CBC reviewed-   Lab Results   Component Value Date    HGB 6.0 (LL) 01/09/2024    HCT 25.0 (L) 01/09/2024     Monitor serial CBC and  transfuse if patient becomes hemodynamically unstable, symptomatic or H/H drops below 7/21.      VTE Risk Mitigation (From admission, onward)      None               On 01/09/2024, patient should be placed in hospital observation services under my care.             Walt Lua MD  Department of Hospital Medicine  Critical access hospital - Emergency Dept

## 2024-01-10 VITALS
DIASTOLIC BLOOD PRESSURE: 79 MMHG | RESPIRATION RATE: 18 BRPM | HEART RATE: 85 BPM | TEMPERATURE: 99 F | WEIGHT: 137 LBS | SYSTOLIC BLOOD PRESSURE: 140 MMHG | BODY MASS INDEX: 25.21 KG/M2 | OXYGEN SATURATION: 96 % | HEIGHT: 62 IN

## 2024-01-10 PROBLEM — D50.9 MICROCYTIC HYPOCHROMIC ANEMIA: Status: ACTIVE | Noted: 2024-01-10

## 2024-01-10 PROBLEM — D69.6 THROMBOCYTOPENIA: Status: ACTIVE | Noted: 2024-01-10

## 2024-01-10 PROBLEM — D57.1 SICKLE CELL ANEMIA WITHOUT CRISIS: Status: ACTIVE | Noted: 2024-01-10

## 2024-01-10 LAB
ANION GAP SERPL CALC-SCNC: 4 MMOL/L (ref 8–16)
ANISOCYTOSIS BLD QL SMEAR: ABNORMAL
BASOPHILS # BLD AUTO: 0.02 K/UL (ref 0–0.2)
BASOPHILS NFR BLD: 0.5 % (ref 0–1.9)
BLD PROD TYP BPU: NORMAL
BLOOD UNIT EXPIRATION DATE: NORMAL
BLOOD UNIT TYPE CODE: 600
BLOOD UNIT TYPE: NORMAL
BUN SERPL-MCNC: 9 MG/DL (ref 6–20)
CALCIUM SERPL-MCNC: 8.4 MG/DL (ref 8.7–10.5)
CHLORIDE SERPL-SCNC: 107 MMOL/L (ref 95–110)
CO2 SERPL-SCNC: 24 MMOL/L (ref 23–29)
CODING SYSTEM: NORMAL
CREAT SERPL-MCNC: 0.7 MG/DL (ref 0.5–1.4)
CROSSMATCH INTERPRETATION: NORMAL
DIFFERENTIAL METHOD BLD: ABNORMAL
DISPENSE STATUS: NORMAL
EOSINOPHIL # BLD AUTO: 0.1 K/UL (ref 0–0.5)
EOSINOPHIL NFR BLD: 1.9 % (ref 0–8)
ERYTHROCYTE [DISTWIDTH] IN BLOOD BY AUTOMATED COUNT: 26.5 % (ref 11.5–14.5)
EST. GFR  (NO RACE VARIABLE): >60 ML/MIN/1.73 M^2
GLUCOSE SERPL-MCNC: 103 MG/DL (ref 70–110)
HCT VFR BLD AUTO: 25.1 % (ref 37–48.5)
HCT VFR BLD AUTO: 26.7 % (ref 37–48.5)
HGB BLD-MCNC: 6.6 G/DL (ref 12–16)
HGB BLD-MCNC: 7.1 G/DL (ref 12–16)
HYPOCHROMIA BLD QL SMEAR: ABNORMAL
IMM GRANULOCYTES # BLD AUTO: 0.01 K/UL (ref 0–0.04)
IMM GRANULOCYTES NFR BLD AUTO: 0.2 % (ref 0–0.5)
LYMPHOCYTES # BLD AUTO: 1.6 K/UL (ref 1–4.8)
LYMPHOCYTES NFR BLD: 38.3 % (ref 18–48)
MAGNESIUM SERPL-MCNC: 1.9 MG/DL (ref 1.6–2.6)
MCH RBC QN AUTO: 17.1 PG (ref 27–31)
MCHC RBC AUTO-ENTMCNC: 26.3 G/DL (ref 32–36)
MCV RBC AUTO: 65 FL (ref 82–98)
MONOCYTES # BLD AUTO: 0.6 K/UL (ref 0.3–1)
MONOCYTES NFR BLD: 13.5 % (ref 4–15)
NEUTROPHILS # BLD AUTO: 1.9 K/UL (ref 1.8–7.7)
NEUTROPHILS NFR BLD: 45.6 % (ref 38–73)
NRBC BLD-RTO: 0 /100 WBC
NUM UNITS TRANS PACKED RBC: NORMAL
OVALOCYTES BLD QL SMEAR: ABNORMAL
PHOSPHATE SERPL-MCNC: 3 MG/DL (ref 2.7–4.5)
PLATELET # BLD AUTO: 98 K/UL (ref 150–450)
PLATELET BLD QL SMEAR: ABNORMAL
PMV BLD AUTO: ABNORMAL FL (ref 9.2–12.9)
POIKILOCYTOSIS BLD QL SMEAR: ABNORMAL
POTASSIUM SERPL-SCNC: 3.8 MMOL/L (ref 3.5–5.1)
RBC # BLD AUTO: 3.85 M/UL (ref 4–5.4)
SCHISTOCYTES BLD QL SMEAR: ABNORMAL
SICKLE CELLS BLD QL SMEAR: ABNORMAL
SODIUM SERPL-SCNC: 135 MMOL/L (ref 136–145)
WBC # BLD AUTO: 4.23 K/UL (ref 3.9–12.7)

## 2024-01-10 PROCEDURE — 21400001 HC TELEMETRY ROOM

## 2024-01-10 PROCEDURE — 30233N1 TRANSFUSION OF NONAUTOLOGOUS RED BLOOD CELLS INTO PERIPHERAL VEIN, PERCUTANEOUS APPROACH: ICD-10-PCS | Performed by: INTERNAL MEDICINE

## 2024-01-10 PROCEDURE — 85018 HEMOGLOBIN: CPT | Performed by: NURSE PRACTITIONER

## 2024-01-10 PROCEDURE — 25000003 PHARM REV CODE 250: Performed by: INTERNAL MEDICINE

## 2024-01-10 PROCEDURE — 25000003 PHARM REV CODE 250: Performed by: NURSE PRACTITIONER

## 2024-01-10 PROCEDURE — 80048 BASIC METABOLIC PNL TOTAL CA: CPT | Performed by: INTERNAL MEDICINE

## 2024-01-10 PROCEDURE — 85014 HEMATOCRIT: CPT | Performed by: NURSE PRACTITIONER

## 2024-01-10 PROCEDURE — 36415 COLL VENOUS BLD VENIPUNCTURE: CPT | Performed by: NURSE PRACTITIONER

## 2024-01-10 PROCEDURE — 63600175 PHARM REV CODE 636 W HCPCS: Performed by: INTERNAL MEDICINE

## 2024-01-10 PROCEDURE — 36415 COLL VENOUS BLD VENIPUNCTURE: CPT | Performed by: INTERNAL MEDICINE

## 2024-01-10 PROCEDURE — 83735 ASSAY OF MAGNESIUM: CPT | Performed by: INTERNAL MEDICINE

## 2024-01-10 PROCEDURE — 85025 COMPLETE CBC W/AUTO DIFF WBC: CPT | Performed by: INTERNAL MEDICINE

## 2024-01-10 PROCEDURE — P9016 RBC LEUKOCYTES REDUCED: HCPCS | Performed by: INTERNAL MEDICINE

## 2024-01-10 PROCEDURE — 84100 ASSAY OF PHOSPHORUS: CPT | Performed by: INTERNAL MEDICINE

## 2024-01-10 RX ORDER — FERROUS SULFATE 325(65) MG
325 TABLET, DELAYED RELEASE (ENTERIC COATED) ORAL DAILY
Qty: 30 TABLET | Refills: 1 | Status: SHIPPED | OUTPATIENT
Start: 2024-01-10 | End: 2024-02-24

## 2024-01-10 RX ORDER — CYANOCOBALAMIN 1000 UG/ML
1000 INJECTION, SOLUTION INTRAMUSCULAR; SUBCUTANEOUS
Status: DISCONTINUED | OUTPATIENT
Start: 2024-01-10 | End: 2024-01-10 | Stop reason: HOSPADM

## 2024-01-10 RX ORDER — CYANOCOBALAMIN 1000 UG/ML
1000 INJECTION, SOLUTION INTRAMUSCULAR; SUBCUTANEOUS
Status: DISCONTINUED | OUTPATIENT
Start: 2024-01-11 | End: 2024-01-10

## 2024-01-10 RX ORDER — BUSPIRONE HYDROCHLORIDE 5 MG/1
5 TABLET ORAL 2 TIMES DAILY
Qty: 30 TABLET | Refills: 1 | Status: SHIPPED | OUTPATIENT
Start: 2024-01-10 | End: 2024-02-09

## 2024-01-10 RX ORDER — LANOLIN ALCOHOL/MO/W.PET/CERES
1 CREAM (GRAM) TOPICAL DAILY
Status: DISCONTINUED | OUTPATIENT
Start: 2024-01-10 | End: 2024-01-10 | Stop reason: HOSPADM

## 2024-01-10 RX ADMIN — CYANOCOBALAMIN 1000 MCG: 1000 INJECTION, SOLUTION INTRAMUSCULAR; SUBCUTANEOUS at 06:01

## 2024-01-10 RX ADMIN — LORAZEPAM 1 MG: 1 TABLET ORAL at 12:01

## 2024-01-10 RX ADMIN — FERROUS SULFATE TAB 325 MG (65 MG ELEMENTAL FE) 1 EACH: 325 (65 FE) TAB at 05:01

## 2024-01-10 NOTE — CONSULTS
[facility]   Hematology/Oncology  Inpatient Consult Note          Patient Name: Duy Cisneros  MRN: [id number]  Admission Date: 1/9/2024  Hospital Length of Stay: 0 days  Code Status: Full Code   Attending Provider: Patti Gary MD  Referring Provider: Stoney  Consulting Provider: Clay Beltran MD  Primary Care Physician: Dasia, Primary Doctor  Principal Problem:<principal problem not specified>    Consults  Subjective:     Chief Complaint: Dizziness (C/o dizziness, sob with exertion. Onset intermittent x 1 mth)          History Present Illness:  38 y.o. female previously unknown to our hematology service who presented to the ED with fatigue, weakness, dizziness and GUILLEN for the past month. Lab work done in ED showed she was anemic with hgb of 6.0. She reports that she has regular heavy menstrual cycles. She was admitted to the hospitalist service.  Her screening test came back positive for sickle cell and a confirmatory hgb electrophoresis has already been ordered. She denies having knowledge of any family members with sickle cell. She denies any current CP, HA/'s N/V or SOB. She does not have a PCP at this time. I discussed with floor staff and Malka Verde NP in person.         Past Medical/Surgical History:  Reg hvy menstrual cycles      Allergies:  Review of patient's allergies indicates:  No Known Allergies    Social/Family History:  Social History     Socioeconomic History    Marital status: Single     No family history on file.      ROS:    GEN:  fatigue, weakness, dizziness and GUILLEN for the past month  HEENT: normal with no HA's, sore throat, stiff neck, changes in vision  CV: normal with no CP, SOB, PND, GUILLEN    PULM: normal with no SOB, cough, hemoptysis, sputum or pleuritic pain  GI: normal with no abdominal pain, nausea, vomiting, constipation, diarrhea, melanotic stools, BRBPR, or hematemesis  : heavy menstrual cycles  BREAST: normal with no mass, discharge, pain  SKIN: normal with  "no rash, erythema, bruising, or swelling        Medications:  Continuous Infusions:  Scheduled Meds:  PRN Meds:0.9%  NaCl infusion (for blood administration), acetaminophen, dextrose 50%, dextrose 50%, diphenhydrAMINE, glucagon (human recombinant), glucose, glucose, LORazepam, naloxone, ondansetron, prochlorperazine, sodium chloride 0.9%         Objective:       Physical Exam:    Vitals:  Blood pressure (!) 107/52, pulse 76, temperature 97.7 °F (36.5 °C), temperature source Oral, resp. rate 18, height 5' 2" (1.575 m), weight 62.1 kg (137 lb), last menstrual period 12/09/2023, SpO2 100 %, not currently breastfeeding.    GEN: no apparent distress, comfortable; AAOx3  HEAD: atraumatic and normocephalic  EYES: no pallor, no icterus, PERRLA  ENT: OMM, no pharyngeal erythema, external ears WNL; no nasal discharge; no thrush  NECK: no masses, thyroid normal, trachea midline, no LAD/LN's, supple  CV: RRR with no murmur; normal pulse; normal S1 and S2; no pedal edema  CHEST: Normal respiratory effort; CTAB; normal breath sounds; no wheeze or crackles  ABDOM: nontender and nondistended; soft; normal bowel sounds; no rebound/guarding  MUSC/Skeletal: ROM normal; no crepitus; joints normal; no deformities or arthropathy  EXTREM: no clubbing, cyanosis, inflammation or swelling; IV LUE  SKIN: no rashes, lesions, ulcers, petechiae or subcutaneous nodules; numerous tattoos  : no gallo  NEURO: grossly intact; motor/sensory WNL; AAOx3; no tremors  PSYCH: normal mood, affect and behavior; very anxious  LYMPH: normal cervical, supraclavicular, axillary and groin LN's      Lab Review:   Lab Results   Component Value Date    WBC 4.23 01/10/2024    HGB 7.1 (L) 01/10/2024    HCT 26.7 (L) 01/10/2024    MCV 65 (L) 01/10/2024    PLT 98 (L) 01/10/2024     Lab Results   Component Value Date    IRON 14 (L) 01/09/2024    TRANSFERRIN 551 (H) 01/09/2024    TIBC 771 (H) 01/09/2024    FESATURATED 2 (L) 01/09/2024      Lab Results   Component Value " Date    FERRITIN 1.3 (L) 01/09/2024     CMP  Sodium   Date Value Ref Range Status   01/10/2024 135 (L) 136 - 145 mmol/L Final     Potassium   Date Value Ref Range Status   01/10/2024 3.8 3.5 - 5.1 mmol/L Final     Chloride   Date Value Ref Range Status   01/10/2024 107 95 - 110 mmol/L Final     CO2   Date Value Ref Range Status   01/10/2024 24 23 - 29 mmol/L Final     Glucose   Date Value Ref Range Status   01/10/2024 103 70 - 110 mg/dL Final     BUN   Date Value Ref Range Status   01/10/2024 9 6 - 20 mg/dL Final     Creatinine   Date Value Ref Range Status   01/10/2024 0.7 0.5 - 1.4 mg/dL Final     Calcium   Date Value Ref Range Status   01/10/2024 8.4 (L) 8.7 - 10.5 mg/dL Final     Total Protein   Date Value Ref Range Status   01/09/2024 7.5 6.0 - 8.4 g/dL Final     Albumin   Date Value Ref Range Status   01/09/2024 4.4 3.5 - 5.2 g/dL Final     Total Bilirubin   Date Value Ref Range Status   01/09/2024 0.4 0.1 - 1.0 mg/dL Final     Comment:     For infants and newborns, interpretation of results should be based  on gestational age, weight and in agreement with clinical  observations.    Premature Infant recommended reference ranges:  Up to 24 hours.............<8.0 mg/dL  Up to 48 hours............<12.0 mg/dL  3-5 days..................<15.0 mg/dL  6-29 days.................<15.0 mg/dL       Alkaline Phosphatase   Date Value Ref Range Status   01/09/2024 60 55 - 135 U/L Final     AST   Date Value Ref Range Status   01/09/2024 15 10 - 40 U/L Final     ALT   Date Value Ref Range Status   01/09/2024 15 10 - 44 U/L Final     Anion Gap   Date Value Ref Range Status   01/10/2024 4 (L) 8 - 16 mmol/L Final     eGFR   Date Value Ref Range Status   01/10/2024 >60.0 >60 mL/min/1.73 m^2 Final       Lab Results   Component Value Date    XRBPYGZA05 218 01/09/2024     Lab Results   Component Value Date    FOLATE 8.0 01/09/2024         Diagnostic Results:  X-Ray Chest AP [[id number]] Collected: 01/09/24 1243   Order Status:  Completed Updated: 01/09/24 1318   Narrative:     HISTORY: shortness of breath    FINDINGS: Portable chest radiograph at 1305 hours with no prior studies for comparison shows the cardiomediastinal silhouette and pulmonary vasculature are within normal limits.    The lungs are normally expanded, with no consolidation, large pleural effusion, or evidence of pulmonary edema. No confluent infiltrates or pneumothorax. There are no significant osseous abnormalities.    IMPRESSION: No evidence of active cardiopulmonary disease.       Assessment/Plan:     IMPRESSION:  (1) 38 y.o. female  previously unknown to our hematology service who presented to the ED with fatigue, weakness, dizziness and GUILLEN for the past month. Lab work done in ED showed she was anemic with hgb of 6.0. She was admitted to the hospitalist service.    (2) Anemia due to chronic blood loss and iron deficiency, and her sickle cell screen was positive but she denies any knowledge of having any sickle cell in family  - a hgb electrophoresis has already been ordered to confirm  - microcytic indices  - low iron, %iron sat and ferritin consistent with an iron deficiency state  - total bili was WNL, so I do not suspect any significant hemolytic process at this time  - ordered retic, LDH, hgb electrophoresis    (3) Thrombocytopenia  - possibly viral or autoimmune mediated such as chronic ITP  - do not suspect any TTP or HUS at this time as she is not hemolyzing, no fever, no MS changes and no renal dysfunction  - check US of liver and spleen, JUDIE, platelet ab screen, hepatitis panel, EBV, and HIV    (4) Heavy menstrual cycles    (5) Mild borderline Hyponatremia    (6) B12 deficiency    (7) Normal WBC    (8) Normal renal function    (9) Childbirth in 2020 with no issues          Active Diagnoses:    Diagnosis Date Noted POA    Symptomatic anemia [D64.9] 01/09/2024 Yes      Problems Resolved During this Admission:           PLAN:   Monitor labs and transfuse as  needed  Recommend starting her on oral iron po daily (ICAR-C) - would recommend using caution w/ IV iron if she has sickle cell as they tend to store iron inappropriately  Recommend GYN evaluation (can be done as outpatient)  Check US of liver and spleen, JUDIE, platelet ab screen, hepatitis panel, EBV, and HIV; retic, LDH and hgb electrophoresis; stool OBS x3  Start B12 1000mcg IM/SQ weekly x 4 then monthly therafter   consult - no PCP, no insurance benefits  Will follow with you             Thank you for your consult.      Clay Beltran MD  Hematology/Oncology  [facility]

## 2024-01-10 NOTE — PLAN OF CARE
Discharge orders and chart reviewed. No other discharge needs noted at this time. Pt is clear for discharge from case management, after US reviewed with KELSEY Ace. Pt is discharging to home.    Follow up info added to AVS - Case management to call patient with hospital follow up appointment at [facility] once appointment obtained     01/10/24 6863   Final Note   Assessment Type Final Discharge Note   Anticipated Discharge Disposition Home   What phone number can be called within the next 1-3 days to see how you are doing after discharge? [phone]   Hospital Resources/Appts/Education Provided Appointment suggestion unavailable   Post-Acute Status   Discharge Delays (!) Procedure Scheduling (IR, OR, Labs, Echo, Cath, Echo, EEG)

## 2024-01-10 NOTE — CONSULTS
I came by to do the consult.  Dr. Beltran of our group has already seen her.  Grant Agosto MD.  1/10/24

## 2024-01-10 NOTE — DISCHARGE SUMMARY
"Critical access hospital Medicine  Discharge Summary      Patient Name: Duy Cisneros  MRN: [id number]  EDGARDO: [id number]  Patient Class: IP- Inpatient  Admission Date: 1/9/2024  Hospital Length of Stay: 0 days  Discharge Date and Time:  01/10/2024 3:39 PM  Attending Physician: Patti Gary MD   Discharging Provider: BRETT Fontenot  Primary Care Provider: Dasia Primary Doctor    Primary Care Team: Networked reference to record PCT     HPI:   1/9/2024  Ms Cisneros notes sob benson and dizziness for a " minute". She has no Hx of anemia. Pt has had no CBC recorded in care everywhere.  No Hx of anemia with pregnancy or childhood anemia    * No surgery found *      Hospital Course:   Patient with poorly defined medical history presented for evaluation of acute dizziness and was found to have profound microcytic, hypochromic anemia with hgb 6. Also low platelet count 117. She was transfused 1 unit PRBC with expected response to treatment - hgb improved to 7.1. Sickle cell screen obtained due to MCV 63 and was positive. Iron studies also abnormal - low. Hematology was consulted and further workup and testing performed and pending. Symptoms improved. Dr. Beltran will see patient in clinic for results of labs started and pending as well as further anemia workup as warranted.  Transvaginal non-ob US obtained that showed enlarged uterus with multiple fibroids - the largest is posterior and abuts the fundus as well as a 2.6 cm right ovarian cyst.. Ambulatory referral to GYN ordered. CM arranged follow up with primary care.  Also Dr. Beltran requested abdominal ultrasound complete.  Discussed with Dr. Beltran who okayed the imaging for outpatient.    She requested benzos for anxiety stating that she has severe anxiety. Benzos deferred but Buspar low dose prescribed - can discuss with PCP for surveillance and refilling if warranted. She will discharge to home in stable condition. FU with PCP and Hem/onc. " Vitals stable.  Patient was explained fully all of the findings.  Findings also written in her patient instructions for further clarification.     Goals of Care Treatment Preferences:  Code Status: Full Code      Consults:   Consults (From admission, onward)          Status Ordering Provider     Inpatient consult to   Once        Provider:  (Not yet assigned)    Acknowledged ANTONIO HAJI     Inpatient consult to Hematology Oncology  Once        Provider:  Antonio Haji MD    Acknowledged NINA JOHNS     Inpatient consult to Hospitalist  Once        Provider:  (Not yet assigned)    Acknowledged NINA JOHNS                Final Active Diagnoses:    Diagnosis Date Noted POA    PRINCIPAL PROBLEM:  Symptomatic anemia [D64.9] 01/09/2024 Yes    Thrombocytopenia [D69.6] 01/10/2024 Yes    Microcytic hypochromic anemia [D50.9] 01/10/2024 Yes    Sickle cell anemia without crisis [D57.1] 01/10/2024 Yes      Problems Resolved During this Admission:       Discharged Condition: stable    Disposition: Home or Self Care    Follow Up:   Follow-up Information       [facility] Follow up.    Contact information:  [address] [address]  [phone]               Antonio Haji MD Follow up.    Specialties: Hematology, Hematology and Oncology, Oncology  Why: For outpatient follow-up/post hospitalizaton  Contact information:  [address]  [address]  [facility] [address]  [phone]                           Patient Instructions:      US Abdomen Limited   Standing Status: Future Standing Exp. Date: 01/10/25     Order Specific Question Answer Comments   May the Radiologist modify the order per protocol to meet the clinical needs of the patient? Yes    Release to patient Immediate      Ambulatory referral/consult to Hematology / Oncology   Standing Status: Future   Referral Priority: Routine Referral Type: Consultation   Referral Reason:  Specialty Services Required   Referred to Provider: ANTONIO HAJI Requested Specialty: Hematology and Oncology   Number of Visits Requested: 1     Ambulatory referral/consult to Gynecology   Standing Status: Future   Referral Priority: Routine Referral Type: Consultation   Referral Reason: Specialty Services Required   Requested Specialty: Gynecology   Number of Visits Requested: 1     Ambulatory referral/consult to Gynecology   Standing Status: Future   Referral Priority: Routine Referral Type: Consultation   Referral Reason: Specialty Services Required   Requested Specialty: Gynecology   Number of Visits Requested: 1     Diet Cardiac     Activity as tolerated       Significant Diagnostic Studies: Labs: CMP   Recent Labs   Lab 01/09/24  1238 01/10/24  0619    135*   K 3.8 3.8    107   CO2 24 24   GLU 99 103   BUN 13 9   CREATININE 0.7 0.7   CALCIUM 9.2 8.4*   PROT 7.5  --    ALBUMIN 4.4  --    BILITOT 0.4  --    ALKPHOS 60  --    AST 15  --    ALT 15  --    ANIONGAP 5* 4*    and CBC   Recent Labs   Lab 01/09/24  1238 01/10/24  0619 01/10/24  0946   WBC 6.10 4.23  --    HGB 6.0* 6.6* 7.1*   HCT 25.0* 25.1* 26.7*   * 98*  --        Pending Diagnostic Studies:       Procedure Component Value Units Date/Time    JUDIE Screen w/Reflex [[id number]]     Order Status: Sent Lab Status: No result     Specimen: Blood     Alexandro-Barr Virus (EBV) Antibody Panel [[id number]]     Order Status: Sent Lab Status: No result     Specimen: Blood     HIV 1/2 Ag/Ab (4th Gen) [[id number]]     Order Status: Sent Lab Status: No result     Specimen: Blood     Hematocrit [[id number]]     Order Status: Sent Lab Status: No result     Specimen: Blood     Hemoglobin [[id number]]     Order Status: Sent Lab Status: No result     Specimen: Blood     Hemoglobin Electrophoresis,Hgb A2 Primitivo. [[id number]]     Order Status: Sent Lab Status: No result     Specimen: Blood     Hepatitis Panel, Acute [[id number]]     Order Status: Sent  Lab Status: No result     Specimen: Blood     Lactate dehydrogenase [[id number]]     Order Status: Sent Lab Status: No result     Specimen: Blood     Platelet antibodies, indirect [[id number]]     Order Status: Sent Lab Status: No result     Specimen: Blood     Reticulocytes [[id number]]     Order Status: Sent Lab Status: No result     Specimen: Blood            Medications:  Reconciled Home Medications:      Medication List        START taking these medications      busPIRone 5 MG Tab  Commonly known as: BUSPAR  Take 1 tablet (5 mg total) by mouth 2 (two) times daily.     ferrous sulfate 325 (65 FE) MG EC tablet  Take 1 tablet (325 mg total) by mouth once daily.              Indwelling Lines/Drains at time of discharge:   Lines/Drains/Airways       None                   Time spent on the discharge of patient: 35 minutes           Malka Verde, CARRIE, APRN, FNP-C  [facility] of Hospital Medicine  [facility]  [email or web address]

## 2024-01-10 NOTE — PLAN OF CARE
Formerly [facility]  Initial Discharge Assessment       Primary Care Provider: No, Primary Doctor    Admission Diagnosis: Anemia, unspecified type [D64.9]    Admission Date: 1/9/2024  Expected Discharge Date: 1/12/2024    Met with pt at bedside to complete discharge assessment, verified pharmacy and information on facesheet.  Pt has no PCP, will establish care at [facility].  No insurance listed and pt failed to reapply for Medicaid.  Will drive self home.      Transition of Care Barriers: None    Payor: /     Extended Emergency Contact Information  Primary Emergency Contact: Duy Cisneros  Mobile Phone: [phone]  Relation: Mother  Preferred language: English   needed? No    Discharge Plan A: Home Health, Home  Discharge Plan B: Home      [id number] [place] [id number] [facility]  [address] [facility] [address]  Phone: [phone] Fax: [phone]      Initial Assessment (most recent)       Adult Discharge Assessment - 01/10/24 1257          Discharge Assessment    Assessment Type Discharge Planning Assessment     Confirmed/corrected address, phone number and insurance Yes     Confirmed Demographics Correct on Facesheet     Source of Information patient     Communicated NAZARIO with patient/caregiver No     People in Home alone     Do you expect to return to your current living situation? Yes     Prior to hospitilization cognitive status: Alert/Oriented     Current cognitive status: Alert/Oriented     Walking or Climbing Stairs Difficulty no     Dressing/Bathing Difficulty no     Home Accessibility not wheelchair accessible;stairs within home     Number of Stairs, Within Home, Primary --   15    Home Layout Bathroom on 2nd floor;Bedroom on 2nd floor     Equipment Currently Used at Home none     Readmission within 30 days? No     Patient currently being followed by outpatient case management? No     Do you currently have  service(s) that help you manage your care at home? No     Do you take prescription medications? No     Do you have prescription coverage? No     Do you have any problems affording any of your prescribed medications? TBD     Who is going to help you get home at discharge? self     How do you get to doctors appointments? car, drives self     Are you on dialysis? No     Do you take coumadin? No     Discharge Plan A Home Health;Home     Discharge Plan B Home     DME Needed Upon Discharge  none     Discharge Plan discussed with: Patient     Transition of Care Barriers None        Physical Activity    On average, how many days per week do you engage in moderate to strenuous exercise (like a brisk walk)? 0 days     On average, how many minutes do you engage in exercise at this level? 0 min        Financial Resource Strain    How hard is it for you to pay for the very basics like food, housing, medical care, and heating? Not hard at all        Housing Stability    In the last 12 months, was there a time when you were not able to pay the mortgage or rent on time? No     In the last 12 months, was there a time when you did not have a steady place to sleep or slept in a shelter (including now)? No        Transportation Needs    In the past 12 months, has lack of transportation kept you from medical appointments or from getting medications? No     In the past 12 months, has lack of transportation kept you from meetings, work, or from getting things needed for daily living? No        Food Insecurity    Within the past 12 months, you worried that your food would run out before you got the money to buy more. Never true     Within the past 12 months, the food you bought just didn't last and you didn't have money to get more. Never true        Social Connections    In a typical week, how many times do you talk on the phone with family, friends, or neighbors? More than three times a week     How often do you attend Yarsanism or  Tenriism services? Never     Do you belong to any clubs or organizations such as Faith groups, unions, fraternal or athletic groups, or school groups? No     How often do you attend meetings of the clubs or organizations you belong to? Never     Are you , , , , never , or living with a partner? Never         Alcohol Use    Q1: How often do you have a drink containing alcohol? 2-3 times a week     Q2: How many drinks containing alcohol do you have on a typical day when you are drinking? 1 or 2     Q3: How often do you have six or more drinks on one occasion? Never

## 2024-01-10 NOTE — NURSING
Nurses Note -- 4 Eyes      1/9/2024   6:33 PM      Skin assessed during: Admit      [x] No Altered Skin Integrity Present    []Prevention Measures Documented      [] Yes- Altered Skin Integrity Present or Discovered   [] LDA Added if Not in Epic (Describe Wound)   [] New Altered Skin Integrity was Present on Admit and Documented in LDA   [] Wound Image Taken    Wound Care Consulted? No    Attending Nurse: Kailey Lentz RN/Staff Member:   Josep

## 2024-01-10 NOTE — DISCHARGE INSTRUCTIONS
You were found to have anemia which responded well to blood transfusion  You also had low platelet count and was found to have sickle cell trait.  Hematology will see you outpatient for further workup and studies to rule out sickle cell completely.  Take iron pills daily as prescribed for iron deficiency anemia.  Iron can sometimes be constipating so take bowel regimen as needed    Outpatient abdominal ultrasound this can be arranged by case management who will call you to arrange or primary care doctor can arrange  Follow-up with gyn as your pelvic ultrasound showed that you have multiple fibroids that could be contributing to your abnormal bleeding that we discussed.  Follow-up with primary care as listed and they will help you navigate this process.      Complete medications as ordered  Follow all discharge instructions.  Please schedule follow up appointments as necessary      When to Call Your Doctor  Call your doctor immediately if you have any of the following:  Severe headache  Severe dizziness, or fainting  Nausea or vomiting  Fast heartbeat (higher than 100 beats per minute)  Fever or chills  Swollen ankles  Weakness  Chest Pain attacks that last longer, occur more often, or are more severe than in the past

## 2024-01-10 NOTE — CARE UPDATE
Refuses any new blood work        Malka Verde, CARRIE, APRN, FNP-C  [facility] Medicine  [facility]  [email or web address]

## 2024-01-10 NOTE — HOSPITAL COURSE
Patient with poorly defined medical history presented for evaluation of acute dizziness and was found to have profound microcytic, hypochromic anemia with hgb 6. Also low platelet count 117. She was transfused 1 unit PRBC with expected response to treatment - hgb improved to 7.1. Sickle cell screen obtained due to MCV 63 and was positive. Iron studies also abnormal - low. Hematology was consulted and further workup and testing performed and pending. Symptoms improved. Dr. Beltran will see patient in clinic for results of labs started and pending as well as further anemia workup as warranted.  Transvaginal non-ob US obtained that showed enlarged uterus with multiple fibroids - the largest is posterior and abuts the fundus as well as a 2.6 cm right ovarian cyst.. Ambulatory referral to GYN ordered. CM arranged follow up with primary care.  Also Dr. Beltran requested abdominal ultrasound complete.  Discussed with Dr. Beltran who okayed the imaging for outpatient.    She requested benzos for anxiety stating that she has severe anxiety. Benzos deferred but Buspar low dose prescribed - can discuss with PCP for surveillance and refilling if warranted. She will discharge to home in stable condition. FU with PCP and Hem/onc. Vitals stable.  Patient was explained fully all of the findings.  Findings also written in her patient instructions for further clarification.

## 2024-01-11 ENCOUNTER — NURSE TRIAGE (OUTPATIENT)
Dept: ADMINISTRATIVE | Facility: CLINIC | Age: 39
End: 2024-01-11

## 2024-01-11 LAB
LABCORP MISC TEST CODE: NORMAL
LABCORP MISC TEST NAME: NORMAL
LABCORP MISCELLANEOUS TEST: NORMAL

## 2024-01-11 NOTE — NURSING
Pt refused any additional lab draws prior to discharge.  Explained that results may be needed at her follow-up.  She still refuses at this time

## 2024-01-12 NOTE — TELEPHONE ENCOUNTER
Pt calling about funny feeling in her hand and toe and on the left side of her body. Pt said that it started when she started goggling stuff and now unsure if its anxiety or if something is going on. Pt triaged and care advice is to see MD within 4 hours and to call back if any weakness, tingling or numbness in any extremity. Pt said that she feels like she is making it worse looking stuff up. Pt told to look up foods high in Iron and to start eating that ans also staying hydrated. No [facility] PCP                             Reason for Disposition   [1] Tingling (e.g., pins and needles) of the face, arm / hand, or leg / foot on one side of the body AND [2] present now (Exceptions: Chronic or recurrent symptom lasting > 4 weeks; or tingling from known cause, such as: bumped elbow, carpal tunnel syndrome, pinched nerve, frostbite.)    Additional Information   Negative: [1] SEVERE weakness (i.e., unable to walk or barely able to walk, requires support) AND [2] new-onset or worsening   Negative: [1] Weakness (i.e., paralysis, loss of muscle strength) of the face, arm / hand, or leg / foot on one side of the body AND [2] sudden onset AND [3] present now  (Exception: Bell's palsy suspected [i.e., weakness only on one side of the face, developing over hours to days, no other symptoms].)   Negative: [1] Numbness (i.e., loss of sensation) of the face, arm / hand, or leg / foot on one side of the body AND [2] sudden onset AND [3] present now   Negative: [1] Loss of speech or garbled speech AND [2] sudden onset AND [3] present now   Negative: Difficult to awaken or acting confused (e.g., disoriented, slurred speech)   Negative: Sounds like a life-threatening emergency to the triager   Negative: Headache  (and neurologic deficit)   Negative: [1] Back pain AND [2] numbness (loss of sensation) in groin or rectal area   Negative: [1] Unable to urinate (or only a few drops) > 4 hours AND [2] bladder feels very full (e.g., palpable  bladder or strong urge to urinate)   Negative: [1] Loss of bladder or bowel control (urine or bowel incontinence; wetting self, leaking stool) AND [2] new-onset   Negative: [1] Weakness (i.e., paralysis, loss of muscle strength) of the face, arm / hand, or leg / foot on one side of the body AND [2] sudden onset AND [3] brief (now gone)   Negative: [1] Numbness (i.e., loss of sensation) of the face, arm / hand, or leg / foot on one side of the body AND [2] sudden onset AND [3] brief (now gone)   Negative: [1] Loss of speech or garbled speech AND [2] sudden onset AND [3] brief (now gone)   Negative: Bell's palsy suspected (i.e., weakness on only one side of the face, developing over hours to days, no other symptoms)   Negative: Patient sounds very sick or weak to the triager   Negative: Neck pain (and neurologic deficit)   Negative: Back pain (and neurologic deficit)   Negative: [1] Weakness of the face, arm / hand, or leg / foot on one side of the body AND [2] gradual onset (e.g., days to weeks) AND [3] present now   Negative: [1] Numbness (i.e., loss of sensation) of the face, arm / hand, or leg / foot on one side of the body AND [2] gradual onset (e.g., days to weeks) AND [3] present now   Negative: [1] Loss of speech or garbled speech AND [2] gradual onset (e.g., days to weeks) AND [3] present now    Protocols used: Neurologic Deficit-A-AH

## 2024-01-30 ENCOUNTER — TELEPHONE (OUTPATIENT)
Dept: HEMATOLOGY/ONCOLOGY | Facility: CLINIC | Age: 39
End: 2024-01-30

## 2024-02-19 ENCOUNTER — HOSPITAL ENCOUNTER (EMERGENCY)
Facility: HOSPITAL | Age: 39
Discharge: HOME | End: 2024-02-19
Attending: EMERGENCY MEDICINE

## 2024-02-19 VITALS
HEART RATE: 88 BPM | SYSTOLIC BLOOD PRESSURE: 172 MMHG | DIASTOLIC BLOOD PRESSURE: 79 MMHG | OXYGEN SATURATION: 100 % | TEMPERATURE: 100 F | BODY MASS INDEX: 25.76 KG/M2 | WEIGHT: 140 LBS | RESPIRATION RATE: 16 BRPM | HEIGHT: 62 IN

## 2024-02-19 DIAGNOSIS — N30.01 ACUTE CYSTITIS WITH HEMATURIA: Primary | ICD-10-CM

## 2024-02-19 LAB
B-HCG UR QL: NEGATIVE
BACTERIA #/AREA URNS HPF: ABNORMAL /HPF
BILIRUB UR QL STRIP: NEGATIVE
CLARITY UR: ABNORMAL
COLOR UR: YELLOW
CTP QC/QA: YES
GLUCOSE UR QL STRIP: NEGATIVE
HGB UR QL STRIP: ABNORMAL
HYALINE CASTS #/AREA URNS LPF: 10 /LPF
KETONES UR QL STRIP: NEGATIVE
LEUKOCYTE ESTERASE UR QL STRIP: ABNORMAL
MICROSCOPIC COMMENT: ABNORMAL
NITRITE UR QL STRIP: NEGATIVE
PH UR STRIP: 7 [PH] (ref 5–8)
PROT UR QL STRIP: ABNORMAL
RBC #/AREA URNS HPF: 23 /HPF (ref 0–4)
SP GR UR STRIP: 1.02 (ref 1–1.03)
SQUAMOUS #/AREA URNS HPF: 1 /HPF
URN SPEC COLLECT METH UR: ABNORMAL
UROBILINOGEN UR STRIP-ACNC: NEGATIVE EU/DL
WBC #/AREA URNS HPF: >100 /HPF (ref 0–5)

## 2024-02-19 PROCEDURE — 87086 URINE CULTURE/COLONY COUNT: CPT | Performed by: STUDENT IN AN ORGANIZED HEALTH CARE EDUCATION/TRAINING PROGRAM

## 2024-02-19 PROCEDURE — 81001 URINALYSIS AUTO W/SCOPE: CPT | Performed by: STUDENT IN AN ORGANIZED HEALTH CARE EDUCATION/TRAINING PROGRAM

## 2024-02-19 PROCEDURE — 87186 SC STD MICRODIL/AGAR DIL: CPT | Performed by: STUDENT IN AN ORGANIZED HEALTH CARE EDUCATION/TRAINING PROGRAM

## 2024-02-19 PROCEDURE — 87077 CULTURE AEROBIC IDENTIFY: CPT | Performed by: STUDENT IN AN ORGANIZED HEALTH CARE EDUCATION/TRAINING PROGRAM

## 2024-02-19 PROCEDURE — 99283 EMERGENCY DEPT VISIT LOW MDM: CPT

## 2024-02-19 PROCEDURE — 25000003 PHARM REV CODE 250: Performed by: STUDENT IN AN ORGANIZED HEALTH CARE EDUCATION/TRAINING PROGRAM

## 2024-02-19 PROCEDURE — 81025 URINE PREGNANCY TEST: CPT | Performed by: STUDENT IN AN ORGANIZED HEALTH CARE EDUCATION/TRAINING PROGRAM

## 2024-02-19 RX ORDER — ACETAMINOPHEN 500 MG
1000 TABLET ORAL
Status: COMPLETED | OUTPATIENT
Start: 2024-02-19 | End: 2024-02-19

## 2024-02-19 RX ORDER — NITROFURANTOIN 25; 75 MG/1; MG/1
100 CAPSULE ORAL 2 TIMES DAILY
Qty: 14 CAPSULE | Refills: 0 | Status: SHIPPED | OUTPATIENT
Start: 2024-02-19 | End: 2024-02-26

## 2024-02-19 RX ADMIN — ACETAMINOPHEN 1000 MG: 500 TABLET ORAL at 04:02

## 2024-02-19 NOTE — ED PROVIDER NOTES
Encounter Date: 2/19/2024       History     Chief Complaint   Patient presents with    Urinary Frequency     PAIN AT END OF URINATION, X 2 DAYS     38-year-old female no significant past medical history presents emergency room for evaluation of 2 days of urinary frequency and dysuria.  Area primarily at the end of urination.  No abdominal pain, nausea, vomiting, back pain.  No fevers.    The history is provided by the patient. No  was used.     Review of patient's allergies indicates:  No Known Allergies  No past medical history on file.  No past surgical history on file.  No family history on file.     Review of Systems   Constitutional:  Negative for chills, fatigue and fever.   HENT:  Negative for congestion, hearing loss, sore throat and trouble swallowing.    Eyes:  Negative for visual disturbance.   Respiratory:  Negative for cough, chest tightness and shortness of breath.    Cardiovascular:  Negative for chest pain.   Gastrointestinal:  Negative for abdominal pain and nausea.   Endocrine: Negative for polyuria.   Genitourinary:  Positive for dysuria and frequency. Negative for difficulty urinating, flank pain, hematuria, pelvic pain, vaginal bleeding and vaginal discharge.   Musculoskeletal:  Negative for arthralgias and myalgias.   Skin:  Negative for rash.   Neurological:  Negative for dizziness and headaches.   Psychiatric/Behavioral:  The patient is not nervous/anxious.        Physical Exam     Initial Vitals [02/19/24 1517]   BP Pulse Resp Temp SpO2   (!) 149/101 84 18 99.9 °F (37.7 °C) 99 %      MAP       --         Physical Exam    Nursing note and vitals reviewed.  Constitutional: She appears well-developed and well-nourished.   HENT:   Head: Normocephalic and atraumatic.   Eyes: Conjunctivae are normal. Pupils are equal, round, and reactive to light.   Neck: Neck supple.   Normal range of motion.  Cardiovascular:  Intact distal pulses.           Pulmonary/Chest: No respiratory  distress.   Abdominal: Abdomen is soft. She exhibits no distension. There is no abdominal tenderness.   Musculoskeletal:         General: Normal range of motion.      Cervical back: Normal range of motion and neck supple.     Neurological: She is alert and oriented to person, place, and time. GCS score is 15. GCS eye subscore is 4. GCS verbal subscore is 5. GCS motor subscore is 6.   Skin: Skin is warm and dry. Capillary refill takes less than 2 seconds.   Psychiatric: She has a normal mood and affect. Her behavior is normal. Judgment and thought content normal.         ED Course   Procedures  Labs Reviewed   URINALYSIS, REFLEX TO URINE CULTURE - Abnormal; Notable for the following components:       Result Value    Appearance, UA Cloudy (*)     Protein, UA 1+ (*)     Occult Blood UA 2+ (*)     Leukocytes, UA 3+ (*)     All other components within normal limits    Narrative:     Specimen Source->Urine   URINALYSIS MICROSCOPIC - Abnormal; Notable for the following components:    RBC, UA 23 (*)     WBC, UA >100 (*)     Bacteria Many (*)     Hyaline Casts, UA 10 (*)     All other components within normal limits    Narrative:     Specimen Source->Urine   CULTURE, URINE   POCT URINE PREGNANCY          Imaging Results    None          Medications   acetaminophen tablet 1,000 mg (1,000 mg Oral Given 2/19/24 1630)     Medical Decision Making  Patient presents for emergent evaluation of urinary frequency. Workup today consistent with likely acute cystitis.  Differential includes UTI, pyelonephritis, nephrolithiasis, STD/STI.  Patient is nontoxic and well appearing, Afebrile with stable vital signs.  Labs were ordered and documented in the ED course.  Urinalysis concerning for UTI.  Given otherwise benign presentation, no indication for additional lab work or imaging.  Will discharge home with Macrobid.  Given Tylenol in the emergency room.    Given patient presentation and workup, doubt emergent or surgical pathology  necessitating consultation or admission from the emergency department.  I discussed the findings with the patient.  I discussed strict and strong return precautions. They will be discharged home in stable condition.      Amount and/or Complexity of Data Reviewed  Labs: ordered. Decision-making details documented in ED Course.    Risk  OTC drugs.  Prescription drug management.               ED Course as of 02/19/24 1930 Mon Feb 19, 2024   1652 BP(!): 149/101 [AN]   1652 Temp: 99.9 °F (37.7 °C) [AN]   1652 Pulse: 84 [AN]   1652 Resp: 18 [AN]   1652 SpO2: 99 % [AN]   1652 hCG Qualitative, Urine: Negative [AN]   1902 WBC, UA(!): >100 [AN]   1902 RBC, UA(!): 23 [AN]   1902 Hyaline Casts, UA(!): 10 [AN]   1902 Bacteria, UA(!): Many [AN]   1902 Leukocyte Esterase, UA(!): 3+ [AN]      ED Course User Index  [AN] Vimal Krueger PA-C                           Clinical Impression:  Final diagnoses:  [N30.01] Acute cystitis with hematuria (Primary)          ED Disposition Condition    Discharge Stable          ED Prescriptions       Medication Sig Dispense Start Date End Date Auth. Provider    nitrofurantoin, macrocrystal-monohydrate, (MACROBID) 100 MG capsule Take 1 capsule (100 mg total) by mouth 2 (two) times daily. for 7 days 14 capsule 2/19/2024 2/26/2024 Vimal Krueger PA-C          Follow-up Information       Follow up With Specialties Details Why Contact Info Additional Information    [facility] - Emergency Dept Emergency Medicine Go to  As needed, If symptoms worsen [address] [id number] [phone] 1st floor    Primary Care Manager  Schedule an appointment as soon as possible for a visit in 1 week                Vimal Krueger PA-C  02/19/24 1930

## 2024-02-20 NOTE — DISCHARGE INSTRUCTIONS
You were evaluated and treated in the emergency department today. You were found to have a diagnoses that can be managed well at home, however that requires your commitment to getting better.   Problem-Specific Instructions:  Take antibiotics as prescribed.  Follow up with primary care provider.  Return to the emergency room for any new or worsening symptoms.      Ensure you follow up with your Primary Care Provider or any additional providers listed on this discharge sheet. While you may be healthy enough to go home today, I cannot predict the exact course of your diagnoses. As such, it is your responsibility to monitor symptoms, follow-up with another healthcare provider, or return to the emergency room for new or worsening concerns. Unless otherwise instructed, continue all home medications and any new medications prescribed to you in the Emergency Department.   General Maintenance: Ensure adequate hydration to prevent prolonged illness and recovery. Monitor your caloric intake with a goal of obtaining and maintaining a healthy weight to help prevent the development of chronic and life-threatening medical conditions. Start healthy fitness habits and aim for a goal of 30 minutes to an hour of exercise 3-5 times a week. Avoid the use of tobacco, alcohol, and illicit drugs as these may be detrimental to your health goals.

## 2024-02-21 LAB — BACTERIA UR CULT: ABNORMAL

## 2024-07-11 ENCOUNTER — HOSPITAL ENCOUNTER (OUTPATIENT)
Facility: HOSPITAL | Age: 39
Discharge: HOME | End: 2024-07-12
Attending: EMERGENCY MEDICINE | Admitting: HOSPITALIST

## 2024-07-11 DIAGNOSIS — R07.9 CHEST PAIN: ICD-10-CM

## 2024-07-11 DIAGNOSIS — D64.9 ANEMIA: ICD-10-CM

## 2024-07-11 PROBLEM — E87.6 HYPOKALEMIA: Status: ACTIVE | Noted: 2024-07-11

## 2024-07-11 LAB
ABO + RH BLD: NORMAL
ALBUMIN SERPL BCP-MCNC: 3.9 G/DL (ref 3.5–5.2)
ALP SERPL-CCNC: 65 U/L (ref 55–135)
ALT SERPL W/O P-5'-P-CCNC: 59 U/L (ref 10–44)
ANION GAP SERPL CALC-SCNC: 10 MMOL/L (ref 8–16)
AST SERPL-CCNC: 37 U/L (ref 10–40)
B-HCG UR QL: NEGATIVE
BASOPHILS # BLD AUTO: 0.01 K/UL (ref 0–0.2)
BASOPHILS NFR BLD: 0.2 % (ref 0–1.9)
BILIRUB SERPL-MCNC: 0.3 MG/DL (ref 0.1–1)
BLD GP AB SCN CELLS X3 SERPL QL: NORMAL
BLD PROD TYP BPU: NORMAL
BLD PROD TYP BPU: NORMAL
BLOOD UNIT EXPIRATION DATE: NORMAL
BLOOD UNIT EXPIRATION DATE: NORMAL
BLOOD UNIT TYPE CODE: 6200
BLOOD UNIT TYPE CODE: 6200
BLOOD UNIT TYPE: NORMAL
BLOOD UNIT TYPE: NORMAL
BUN SERPL-MCNC: 10 MG/DL (ref 6–20)
CALCIUM SERPL-MCNC: 9.4 MG/DL (ref 8.7–10.5)
CHLORIDE SERPL-SCNC: 108 MMOL/L (ref 95–110)
CO2 SERPL-SCNC: 21 MMOL/L (ref 23–29)
CODING SYSTEM: NORMAL
CODING SYSTEM: NORMAL
CREAT SERPL-MCNC: 0.8 MG/DL (ref 0.5–1.4)
CROSSMATCH INTERPRETATION: NORMAL
CROSSMATCH INTERPRETATION: NORMAL
CTP QC/QA: YES
DIFFERENTIAL METHOD BLD: ABNORMAL
DISPENSE STATUS: NORMAL
DISPENSE STATUS: NORMAL
EOSINOPHIL # BLD AUTO: 0 K/UL (ref 0–0.5)
EOSINOPHIL NFR BLD: 0.2 % (ref 0–8)
ERYTHROCYTE [DISTWIDTH] IN BLOOD BY AUTOMATED COUNT: 19.9 % (ref 11.5–14.5)
EST. GFR  (NO RACE VARIABLE): >60 ML/MIN/1.73 M^2
FERRITIN SERPL-MCNC: 2 NG/ML (ref 20–300)
GLUCOSE SERPL-MCNC: 113 MG/DL (ref 70–110)
HCT VFR BLD AUTO: 27.5 % (ref 37–48.5)
HGB BLD-MCNC: 6.9 G/DL (ref 12–16)
IMM GRANULOCYTES # BLD AUTO: 0.01 K/UL (ref 0–0.04)
IMM GRANULOCYTES NFR BLD AUTO: 0.2 % (ref 0–0.5)
IRON SERPL-MCNC: <10 UG/DL (ref 30–160)
LYMPHOCYTES # BLD AUTO: 1 K/UL (ref 1–4.8)
LYMPHOCYTES NFR BLD: 22.7 % (ref 18–48)
MAGNESIUM SERPL-MCNC: 1.8 MG/DL (ref 1.6–2.6)
MCH RBC QN AUTO: 15.7 PG (ref 27–31)
MCHC RBC AUTO-ENTMCNC: 25.1 G/DL (ref 32–36)
MCV RBC AUTO: 63 FL (ref 82–98)
MONOCYTES # BLD AUTO: 0.4 K/UL (ref 0.3–1)
MONOCYTES NFR BLD: 9.5 % (ref 4–15)
NEUTROPHILS # BLD AUTO: 3.1 K/UL (ref 1.8–7.7)
NEUTROPHILS NFR BLD: 67.2 % (ref 38–73)
NRBC BLD-RTO: 0 /100 WBC
NUM UNITS TRANS PACKED RBC: NORMAL
NUM UNITS TRANS PACKED RBC: NORMAL
PLATELET # BLD AUTO: 248 K/UL (ref 150–450)
PMV BLD AUTO: ABNORMAL FL (ref 9.2–12.9)
POTASSIUM SERPL-SCNC: 3.3 MMOL/L (ref 3.5–5.1)
PROT SERPL-MCNC: 7.5 G/DL (ref 6–8.4)
RBC # BLD AUTO: 4.4 M/UL (ref 4–5.4)
SATURATED IRON: ABNORMAL % (ref 20–50)
SODIUM SERPL-SCNC: 139 MMOL/L (ref 136–145)
SPECIMEN OUTDATE: NORMAL
TOTAL IRON BINDING CAPACITY: 654 UG/DL (ref 250–450)
TRANSFERRIN SERPL-MCNC: 467 MG/DL (ref 200–375)
TROPONIN I SERPL DL<=0.01 NG/ML-MCNC: <0.006 NG/ML (ref 0–0.03)
TSH SERPL DL<=0.005 MIU/L-ACNC: 0.55 UIU/ML (ref 0.4–4)
WBC # BLD AUTO: 4.54 K/UL (ref 3.9–12.7)

## 2024-07-11 PROCEDURE — 80053 COMPREHEN METABOLIC PANEL: CPT | Performed by: EMERGENCY MEDICINE

## 2024-07-11 PROCEDURE — 84484 ASSAY OF TROPONIN QUANT: CPT | Performed by: EMERGENCY MEDICINE

## 2024-07-11 PROCEDURE — 85025 COMPLETE CBC W/AUTO DIFF WBC: CPT | Performed by: EMERGENCY MEDICINE

## 2024-07-11 PROCEDURE — 99285 EMERGENCY DEPT VISIT HI MDM: CPT | Mod: 25

## 2024-07-11 PROCEDURE — G0378 HOSPITAL OBSERVATION PER HR: HCPCS

## 2024-07-11 PROCEDURE — 96374 THER/PROPH/DIAG INJ IV PUSH: CPT

## 2024-07-11 PROCEDURE — 36415 COLL VENOUS BLD VENIPUNCTURE: CPT

## 2024-07-11 PROCEDURE — 93005 ELECTROCARDIOGRAM TRACING: CPT

## 2024-07-11 PROCEDURE — 84466 ASSAY OF TRANSFERRIN: CPT | Performed by: EMERGENCY MEDICINE

## 2024-07-11 PROCEDURE — 63600175 PHARM REV CODE 636 W HCPCS: Performed by: EMERGENCY MEDICINE

## 2024-07-11 PROCEDURE — 84484 ASSAY OF TROPONIN QUANT: CPT | Mod: 91

## 2024-07-11 PROCEDURE — 83735 ASSAY OF MAGNESIUM: CPT

## 2024-07-11 PROCEDURE — 81025 URINE PREGNANCY TEST: CPT | Performed by: EMERGENCY MEDICINE

## 2024-07-11 PROCEDURE — 25000003 PHARM REV CODE 250

## 2024-07-11 PROCEDURE — 36415 COLL VENOUS BLD VENIPUNCTURE: CPT | Performed by: EMERGENCY MEDICINE

## 2024-07-11 PROCEDURE — 36430 TRANSFUSION BLD/BLD COMPNT: CPT

## 2024-07-11 PROCEDURE — 86901 BLOOD TYPING SEROLOGIC RH(D): CPT | Performed by: EMERGENCY MEDICINE

## 2024-07-11 PROCEDURE — 86920 COMPATIBILITY TEST SPIN: CPT | Performed by: EMERGENCY MEDICINE

## 2024-07-11 PROCEDURE — P9016 RBC LEUKOCYTES REDUCED: HCPCS | Performed by: EMERGENCY MEDICINE

## 2024-07-11 PROCEDURE — 93010 ELECTROCARDIOGRAM REPORT: CPT | Mod: ,,, | Performed by: GENERAL PRACTICE

## 2024-07-11 PROCEDURE — 82728 ASSAY OF FERRITIN: CPT | Performed by: EMERGENCY MEDICINE

## 2024-07-11 PROCEDURE — 84443 ASSAY THYROID STIM HORMONE: CPT | Performed by: EMERGENCY MEDICINE

## 2024-07-11 RX ORDER — IBUPROFEN 200 MG
16 TABLET ORAL
Status: DISCONTINUED | OUTPATIENT
Start: 2024-07-11 | End: 2024-07-12 | Stop reason: HOSPADM

## 2024-07-11 RX ORDER — HYDROCODONE BITARTRATE AND ACETAMINOPHEN 500; 5 MG/1; MG/1
TABLET ORAL
Status: DISCONTINUED | OUTPATIENT
Start: 2024-07-11 | End: 2024-07-12 | Stop reason: HOSPADM

## 2024-07-11 RX ORDER — TALC
9 POWDER (GRAM) TOPICAL NIGHTLY
Status: DISCONTINUED | OUTPATIENT
Start: 2024-07-12 | End: 2024-07-12 | Stop reason: HOSPADM

## 2024-07-11 RX ORDER — IBUPROFEN 200 MG
24 TABLET ORAL
Status: DISCONTINUED | OUTPATIENT
Start: 2024-07-11 | End: 2024-07-12 | Stop reason: HOSPADM

## 2024-07-11 RX ORDER — ALUMINUM HYDROXIDE, MAGNESIUM HYDROXIDE, AND SIMETHICONE 1200; 120; 1200 MG/30ML; MG/30ML; MG/30ML
30 SUSPENSION ORAL 4 TIMES DAILY PRN
Status: DISCONTINUED | OUTPATIENT
Start: 2024-07-11 | End: 2024-07-12 | Stop reason: HOSPADM

## 2024-07-11 RX ORDER — LANOLIN ALCOHOL/MO/W.PET/CERES
800 CREAM (GRAM) TOPICAL
Status: DISCONTINUED | OUTPATIENT
Start: 2024-07-11 | End: 2024-07-12 | Stop reason: HOSPADM

## 2024-07-11 RX ORDER — LORAZEPAM 2 MG/ML
0.5 INJECTION INTRAMUSCULAR
Status: COMPLETED | OUTPATIENT
Start: 2024-07-11 | End: 2024-07-11

## 2024-07-11 RX ORDER — GLUCAGON 1 MG
1 KIT INJECTION
Status: DISCONTINUED | OUTPATIENT
Start: 2024-07-11 | End: 2024-07-12 | Stop reason: HOSPADM

## 2024-07-11 RX ORDER — ACETAMINOPHEN 325 MG/1
650 TABLET ORAL EVERY 4 HOURS PRN
Status: DISCONTINUED | OUTPATIENT
Start: 2024-07-11 | End: 2024-07-12 | Stop reason: HOSPADM

## 2024-07-11 RX ORDER — SIMETHICONE 80 MG
1 TABLET,CHEWABLE ORAL 4 TIMES DAILY PRN
Status: DISCONTINUED | OUTPATIENT
Start: 2024-07-11 | End: 2024-07-12 | Stop reason: HOSPADM

## 2024-07-11 RX ORDER — SODIUM CHLORIDE 0.9 % (FLUSH) 0.9 %
10 SYRINGE (ML) INJECTION EVERY 12 HOURS PRN
Status: DISCONTINUED | OUTPATIENT
Start: 2024-07-11 | End: 2024-07-12 | Stop reason: HOSPADM

## 2024-07-11 RX ORDER — NALOXONE HCL 0.4 MG/ML
0.02 VIAL (ML) INJECTION
Status: DISCONTINUED | OUTPATIENT
Start: 2024-07-11 | End: 2024-07-12 | Stop reason: HOSPADM

## 2024-07-11 RX ADMIN — POTASSIUM BICARBONATE 35 MEQ: 391 TABLET, EFFERVESCENT ORAL at 11:07

## 2024-07-11 RX ADMIN — POTASSIUM BICARBONATE 35 MEQ: 391 TABLET, EFFERVESCENT ORAL at 08:07

## 2024-07-11 RX ADMIN — LORAZEPAM 0.5 MG: 2 INJECTION INTRAMUSCULAR; INTRAVENOUS at 04:07

## 2024-07-11 NOTE — Clinical Note
Diagnosis: Anemia [623168]   Future Attending Provider: TENA RUEDA [[id number]]   Place in Observation: [facility] [[id number]]

## 2024-07-11 NOTE — Clinical Note
"Duy Billyverito" Blayne was seen and treated in our emergency department on 7/11/2024.  She may return to work on 07/12/2024.       If you have any questions or concerns, please don't hesitate to call.      Jacky Rubin RN    "

## 2024-07-11 NOTE — ED PROVIDER NOTES
Encounter Date: 7/11/2024       History     Chief Complaint   Patient presents with    Panic Attack     Called EMS after having panic attack sitting in parking lot -- hx of anxiety     Patient was in usual state health today.  Patient was sitting and became very anxious.  She feels short of breath.  She called EMS.  Somewhat better now.  No chest pain pleurisy hemoptysis.  Similar events due to presumed anxiety in the past.  Patient does have history of iron-deficiency anemia with noncompliance of iron supplementation currently.  There is no GI bleeding.  No focal weakness.  No trouble with vision or speech.  Patient does report continued menorrhagia.  History of uterine fibroids.      Review of patient's allergies indicates:  No Known Allergies  No past medical history on file.  No past surgical history on file.  No family history on file.     Review of Systems   Constitutional:  Negative for chills and fever.   HENT:  Negative for congestion.    Eyes:  Negative for visual disturbance.   Respiratory:  Positive for shortness of breath.    Cardiovascular:  Negative for chest pain and palpitations.   Gastrointestinal:  Negative for abdominal pain and vomiting.   Genitourinary:  Negative for dysuria.   Musculoskeletal:  Negative for joint swelling.   Neurological:  Negative for headaches.   Psychiatric/Behavioral:  Negative for confusion.        Physical Exam     Initial Vitals [07/11/24 1415]   BP Pulse Resp Temp SpO2   (!) 148/94 (!) 115 20 98.6 °F (37 °C) 100 %      MAP       --         Physical Exam    Nursing note and vitals reviewed.  Constitutional: She is not diaphoretic. No distress.   HENT:   Head: Normocephalic and atraumatic.   Eyes: Conjunctivae are normal.   Neck:   Normal range of motion.  Cardiovascular:  Normal rate.           Pulmonary/Chest: Breath sounds normal.   Abdominal: Abdomen is soft. There is no abdominal tenderness.   Musculoskeletal:         General: Normal range of motion.      Cervical  back: Normal range of motion.     Neurological: She is alert.   No gross deficits   Skin: No rash noted.   Psychiatric: She has a normal mood and affect.         ED Course   Procedures  Labs Reviewed   CBC W/ AUTO DIFFERENTIAL   COMPREHENSIVE METABOLIC PANEL   TROPONIN I   POCT URINE PREGNANCY          Imaging Results    None          Medications - No data to display  Medical Decision Making  Patient presents with anxiety and shortness of breath.  Differential diagnosis includes panic attack, symptomatic anemia, pneumonia, acute coronary syndrome.  Here patient does have critical hemoglobin of 6.9.  MCV 63.  CMP essentially unremarkable.  X-ray no acute cardiopulmonary process.  EKG normal sinus rhythm.  Here patient does have symptomatic anemia.  Discussed iron supplementation orally/IV versus blood transfusion.  Patient reports symptoms are severe.  She elects for blood transfusion.  Given critical hemoglobin consent obtained.  Will initiate blood transfusion with possible additional transfusion with hospitalist.  Hospitalist to admit to observation.    Amount and/or Complexity of Data Reviewed  Labs: ordered. Decision-making details documented in ED Course.  Radiology: ordered. Decision-making details documented in ED Course.  ECG/medicine tests:  Decision-making details documented in ED Course.    Risk  Prescription drug management.                                      Clinical Impression:  Final diagnoses:  [R07.9] Chest pain

## 2024-07-11 NOTE — ED PROVIDER NOTES
Encounter Date: 7/11/2024       History     Chief Complaint   Patient presents with    Panic Attack     Called EMS after having panic attack sitting in parking lot -- hx of anxiety     Patient was in usual state health today.  Patient was sitting and became very anxious.  She feels short of breath.  She called EMS.  Somewhat better now.  No chest pain pleurisy hemoptysis.  Similar events due to presumed anxiety in the past.  Patient does have history of iron-deficiency anemia with noncompliance of iron supplementation currently.  There is no GI bleeding.  No focal weakness.  No trouble with vision or speech.      Review of patient's allergies indicates:  No Known Allergies  Past Medical History:   Diagnosis Date    Anemia, unspecified     Anxiety disorder, unspecified     Uterine fibroid      History reviewed. No pertinent surgical history.  No family history on file.  Social History     Tobacco Use    Smoking status: Unknown   Substance Use Topics    Alcohol use: Yes    Drug use: Never     Review of Systems   Constitutional:  Negative for chills and fever.   HENT:  Negative for congestion.    Eyes:  Negative for visual disturbance.   Respiratory:  Positive for shortness of breath.    Cardiovascular:  Negative for chest pain and palpitations.   Gastrointestinal:  Negative for abdominal pain and vomiting.   Genitourinary:  Negative for dysuria.   Musculoskeletal:  Negative for joint swelling.   Neurological:  Negative for headaches.   Psychiatric/Behavioral:  Negative for confusion.        Physical Exam     Initial Vitals [07/11/24 1415]   BP Pulse Resp Temp SpO2   (!) 148/94 (!) 115 20 98.6 °F (37 °C) 100 %      MAP       --         Physical Exam    Nursing note and vitals reviewed.  Constitutional: She is not diaphoretic. No distress.   HENT:   Head: Normocephalic and atraumatic.   Eyes: Conjunctivae are normal.   Neck:   Normal range of motion.  Cardiovascular:  Normal rate.           Pulmonary/Chest: Breath  sounds normal.   Abdominal: Abdomen is soft. There is no abdominal tenderness.   Musculoskeletal:         General: Normal range of motion.      Cervical back: Normal range of motion.     Neurological: She is alert.   No gross deficits   Skin: No rash noted.   Psychiatric: She has a normal mood and affect.         ED Course   Procedures  Labs Reviewed   CBC W/ AUTO DIFFERENTIAL - Abnormal; Notable for the following components:       Result Value    Hemoglobin 6.9 (*)     Hematocrit 27.5 (*)     MCV 63 (*)     MCH 15.7 (*)     MCHC 25.1 (*)     RDW 19.9 (*)     All other components within normal limits    Narrative:     HGB critical result(s) called and verbal readback obtained from   Jonna Gurrola by [facility] 07/11/2024 15:35   COMPREHENSIVE METABOLIC PANEL - Abnormal; Notable for the following components:    Potassium 3.3 (*)     CO2 21 (*)     Glucose 113 (*)     ALT 59 (*)     All other components within normal limits   FERRITIN - Abnormal; Notable for the following components:    Ferritin 2 (*)     All other components within normal limits   IRON AND TIBC - Abnormal; Notable for the following components:    Iron <10 (*)     Transferrin 467 (*)     TIBC 654 (*)     All other components within normal limits   TROPONIN I   TSH   MAGNESIUM   TROPONIN I    Narrative:     STAT, if not done in ED, then at 2nd and 6th hour from  initial draw.   POCT URINE PREGNANCY   TYPE & SCREEN   PREPARE RBC SOFT   PREPARE RBC SOFT          Imaging Results              X-Ray Chest PA And Lateral (Final result)  Result time 07/11/24 15:33:01      Final result by Lance Mendez Jr., MD (07/11/24 15:33:01)                   Impression:      No acute abnormality.      Electronically signed by: Lance Mendez MD  Date:    07/11/2024  Time:    15:33               Narrative:    EXAMINATION:  XR CHEST PA AND LATERAL    CLINICAL HISTORY:  Chest Pain;    TECHNIQUE:  PA and lateral views of the chest were performed.    COMPARISON:  Chest  x-ray of January 9, 2024    FINDINGS:  The lungs are clear, with normal appearance of pulmonary vasculature and no pleural effusion or pneumothorax.    The cardiac silhouette is normal in size. The hilar and mediastinal contours are unremarkable.    Bones are intact.                                       Medications   0.9%  NaCl infusion (for blood administration) (has no administration in time range)   sodium chloride 0.9% flush 10 mL (has no administration in time range)   naloxone 0.4 mg/mL injection 0.02 mg (has no administration in time range)   glucose chewable tablet 16 g (has no administration in time range)   glucose chewable tablet 24 g (has no administration in time range)   glucagon (human recombinant) injection 1 mg (has no administration in time range)   potassium bicarbonate disintegrating tablet 50 mEq (has no administration in time range)   potassium bicarbonate disintegrating tablet 35 mEq (35 mEq Oral Given 7/11/24 2053)   potassium bicarbonate disintegrating tablet 60 mEq (has no administration in time range)   magnesium oxide tablet 800 mg (has no administration in time range)   magnesium oxide tablet 800 mg (has no administration in time range)   acetaminophen tablet 650 mg (has no administration in time range)   simethicone chewable tablet 80 mg (has no administration in time range)   aluminum-magnesium hydroxide-simethicone 200-200-20 mg/5 mL suspension 30 mL (has no administration in time range)   0.9%  NaCl infusion (for blood administration) (has no administration in time range)   dextrose 10% bolus 125 mL 125 mL (has no administration in time range)   dextrose 10% bolus 250 mL 250 mL (has no administration in time range)   LORazepam injection 0.5 mg (0.5 mg Intravenous Given 7/11/24 1638)     Medical Decision Making  Amount and/or Complexity of Data Reviewed  Labs: ordered.  Radiology: ordered.    Risk  Prescription drug management.                                      Clinical  Impression:  Final diagnoses:  [R07.9] Chest pain  [D64.9] Anemia          ED Disposition Condition    Observation                 Kavon Howell MD  07/11/24 0887

## 2024-07-11 NOTE — LETTER
Patient: Duy Cisneros  YOB: 1985  Date: 7/12/2024 Time: 8:14 AM  Location: [facility]    Leaving the Hospital Against Medical Advice    Chart #:[id number]    This will certify that I, the undersigned,    ______________________________________________________________________    A patient in the above named medical center, having requested discharge and removal from the medical center against the advice of my attending physician(s), hereby release [facility], its physicians, officers and employees, severally and individually, from any and all liability of any nature whatsoever for any injury or harm or complication of any kind that may result directly or indirectly, by reason of my terminating my stay as a patient at [facility] and my departure from [facility], and hereby waive any and all rights of action I may now have or later acquire as a result of my voluntary departure from [facility] and the termination of my stay as a patient therein.    This release is made with the full knowledge of the danger that may result from the action which I am taking.      Date:_______________________                         ___________________________                                                                                    Patient/Legal Representative    Witness:        ____________________________                          ___________________________  Nurse                                                                        Physician

## 2024-07-12 VITALS
BODY MASS INDEX: 25.76 KG/M2 | TEMPERATURE: 99 F | HEART RATE: 75 BPM | WEIGHT: 140 LBS | DIASTOLIC BLOOD PRESSURE: 75 MMHG | SYSTOLIC BLOOD PRESSURE: 140 MMHG | RESPIRATION RATE: 16 BRPM | HEIGHT: 62 IN | OXYGEN SATURATION: 100 %

## 2024-07-12 LAB
ANION GAP SERPL CALC-SCNC: 8 MMOL/L (ref 8–16)
ANISOCYTOSIS BLD QL SMEAR: ABNORMAL
BASOPHILS # BLD AUTO: 0.03 K/UL (ref 0–0.2)
BASOPHILS NFR BLD: 0.4 % (ref 0–1.9)
BUN SERPL-MCNC: 9 MG/DL (ref 6–20)
CALCIUM SERPL-MCNC: 9.1 MG/DL (ref 8.7–10.5)
CHLORIDE SERPL-SCNC: 106 MMOL/L (ref 95–110)
CO2 SERPL-SCNC: 23 MMOL/L (ref 23–29)
CREAT SERPL-MCNC: 0.8 MG/DL (ref 0.5–1.4)
DIFFERENTIAL METHOD BLD: ABNORMAL
EOSINOPHIL # BLD AUTO: 0.1 K/UL (ref 0–0.5)
EOSINOPHIL NFR BLD: 0.7 % (ref 0–8)
ERYTHROCYTE [DISTWIDTH] IN BLOOD BY AUTOMATED COUNT: 24.6 % (ref 11.5–14.5)
EST. GFR  (NO RACE VARIABLE): >60 ML/MIN/1.73 M^2
GLUCOSE SERPL-MCNC: 122 MG/DL (ref 70–110)
HCT VFR BLD AUTO: 33 % (ref 37–48.5)
HGB BLD-MCNC: 9.7 G/DL (ref 12–16)
HYPOCHROMIA BLD QL SMEAR: ABNORMAL
IMM GRANULOCYTES # BLD AUTO: 0.02 K/UL (ref 0–0.04)
IMM GRANULOCYTES NFR BLD AUTO: 0.3 % (ref 0–0.5)
LYMPHOCYTES # BLD AUTO: 2.4 K/UL (ref 1–4.8)
LYMPHOCYTES NFR BLD: 32.6 % (ref 18–48)
MCH RBC QN AUTO: 20 PG (ref 27–31)
MCHC RBC AUTO-ENTMCNC: 29.4 G/DL (ref 32–36)
MCV RBC AUTO: 68 FL (ref 82–98)
MONOCYTES # BLD AUTO: 0.8 K/UL (ref 0.3–1)
MONOCYTES NFR BLD: 10.3 % (ref 4–15)
NEUTROPHILS # BLD AUTO: 4 K/UL (ref 1.8–7.7)
NEUTROPHILS NFR BLD: 55.7 % (ref 38–73)
NRBC BLD-RTO: 0 /100 WBC
OVALOCYTES BLD QL SMEAR: ABNORMAL
PLATELET # BLD AUTO: 213 K/UL (ref 150–450)
PLATELET BLD QL SMEAR: ABNORMAL
PMV BLD AUTO: ABNORMAL FL (ref 9.2–12.9)
POIKILOCYTOSIS BLD QL SMEAR: ABNORMAL
POTASSIUM SERPL-SCNC: 3.9 MMOL/L (ref 3.5–5.1)
RBC # BLD AUTO: 4.84 M/UL (ref 4–5.4)
SODIUM SERPL-SCNC: 137 MMOL/L (ref 136–145)
WBC # BLD AUTO: 7.25 K/UL (ref 3.9–12.7)

## 2024-07-12 PROCEDURE — 36415 COLL VENOUS BLD VENIPUNCTURE: CPT

## 2024-07-12 PROCEDURE — G0378 HOSPITAL OBSERVATION PER HR: HCPCS

## 2024-07-12 PROCEDURE — 25000003 PHARM REV CODE 250

## 2024-07-12 PROCEDURE — 80048 BASIC METABOLIC PNL TOTAL CA: CPT

## 2024-07-12 PROCEDURE — 94761 N-INVAS EAR/PLS OXIMETRY MLT: CPT

## 2024-07-12 PROCEDURE — 85025 COMPLETE CBC W/AUTO DIFF WBC: CPT

## 2024-07-12 PROCEDURE — 99900031 HC PATIENT EDUCATION (STAT)

## 2024-07-12 RX ADMIN — ACETAMINOPHEN 650 MG: 325 TABLET ORAL at 07:07

## 2024-07-12 RX ADMIN — MELATONIN TAB 3 MG 9 MG: 3 TAB at 12:07

## 2024-07-12 NOTE — ASSESSMENT & PLAN NOTE
Patient's anemia is currently worsening. Has received 2 units of PRBCs on 7/11/24 . Etiology likely d/t Iron deficiency  Current CBC reviewed-   Lab Results   Component Value Date    HGB 6.9 (LL) 07/11/2024    HCT 27.5 (L) 07/11/2024     Monitor serial CBC and transfuse if patient becomes hemodynamically unstable, symptomatic or H/H drops below 7/21.

## 2024-07-12 NOTE — ASSESSMENT & PLAN NOTE
Patient has hypokalemia which is Acute and currently stable. Most recent potassium levels reviewed-   Lab Results   Component Value Date    K 3.3 (L) 07/11/2024   . Will continue potassium replacement per protocol and recheck repeat levels after replacement completed.

## 2024-07-12 NOTE — H&P
[facility]  History & Physical    Patient Name: Duy Cisneros  MRN: [id number]  Patient Class: OP- Observation  Admission Date: 7/11/2024  Attending Physician: Romelia Larios MD   Primary Care Provider: [facility] Primary Doctor         Patient information was obtained from patient, past medical records, and ER records.     Subjective:     Principal Problem:Symptomatic anemia    Chief Complaint:   Chief Complaint   Patient presents with    Panic Attack     Called EMS after having panic attack sitting in parking lot -- hx of anxiety        HPI: Duy Cisneros is a 39 year old female with a history of iron defiency anemia, anxiety, uterine fibroids, and possible sickle cell seen by Dr. Beltran who presented to the emergency room for episode of anxiety and shortness of breath. The patient endorses she is under a lot of stress currently. Initial ED work up revealed a hemoglobin of 6.9 and hematocrit of 27.5. She reports that she has not been compliant with iron supplementation due to it causing constipation and dark stools. Also, has not been supplementing with vitamin B12 due to lapse in insurance and unable to obtain prescription. She was also unable to follow-up outpatient with Dr. Beltran for further analysis of her anemia and possible sickle cell due to lack of insurance. She denies any chest pain or shortness of breath currently. Troponin negative x2, EKG showed no ST elevation and chest xray showed no acute process. Patient transfused two units total of PRBC's and admitted by hospital medicine for further evaluation and management.     Past Medical History:   Diagnosis Date    Anemia, unspecified     Anxiety disorder, unspecified     Uterine fibroid        History reviewed. No pertinent surgical history.    Review of patient's allergies indicates:  No Known Allergies    No current facility-administered medications on file prior to encounter.     Current Outpatient  Medications on File Prior to Encounter   Medication Sig    busPIRone (BUSPAR) 5 MG Tab Take 1 tablet (5 mg total) by mouth 2 (two) times daily. (Patient not taking: Reported on 7/11/2024)     Family History    None       Tobacco Use    Smoking status: Unknown    Smokeless tobacco: Not on file   Substance and Sexual Activity    Alcohol use: Yes    Drug use: Never    Sexual activity: Yes     Partners: Male     Review of Systems   Respiratory:  Positive for chest tightness and shortness of breath.    Psychiatric/Behavioral:  The patient is nervous/anxious.      Objective:     Vital Signs (Most Recent):  Temp: 98.9 °F (37.2 °C) (07/11/24 1745)  Pulse: 102 (07/11/24 1745)  Resp: 16 (07/11/24 1731)  BP: 136/81 (07/11/24 1745)  SpO2: 100 % (07/11/24 1745) Vital Signs (24h Range):  Temp:  [98.6 °F (37 °C)-98.9 °F (37.2 °C)] 98.9 °F (37.2 °C)  Pulse:  [] 102  Resp:  [16-20] 16  SpO2:  [100 %] 100 %  BP: (127-148)/(68-94) 136/81     Weight: 63.5 kg (140 lb)  Body mass index is 25.61 kg/m².     Physical Exam  Vitals reviewed.   Constitutional:       Appearance: Normal appearance.   HENT:      Head: Normocephalic and atraumatic.      Nose: Nose normal.      Mouth/Throat:      Mouth: Mucous membranes are moist.      Pharynx: Oropharynx is clear.   Eyes:      Extraocular Movements: Extraocular movements intact.      Pupils: Pupils are equal, round, and reactive to light.   Cardiovascular:      Rate and Rhythm: Normal rate and regular rhythm.      Pulses: Normal pulses.      Heart sounds: Normal heart sounds.   Pulmonary:      Effort: Pulmonary effort is normal.      Breath sounds: Normal breath sounds.   Abdominal:      General: Bowel sounds are normal.      Palpations: Abdomen is soft.   Musculoskeletal:         General: Normal range of motion.      Cervical back: Normal range of motion and neck supple.   Skin:     General: Skin is warm and dry.      Capillary Refill: Capillary refill takes less than 2 seconds.       Coloration: Skin is pale.   Neurological:      General: No focal deficit present.      Mental Status: She is alert and oriented to person, place, and time. Mental status is at baseline.   Psychiatric:         Mood and Affect: Mood normal.         Behavior: Behavior normal.         Thought Content: Thought content normal.         Judgment: Judgment normal.              CRANIAL NERVES     CN III, IV, VI   Pupils are equal, round, and reactive to light.       Significant Labs: All pertinent labs within the past 24 hours have been reviewed.  CBC:   Recent Labs   Lab 07/11/24  1519   WBC 4.54   HGB 6.9*   HCT 27.5*        CMP:   Recent Labs   Lab 07/11/24  1519      K 3.3*      CO2 21*   *   BUN 10   CREATININE 0.8   CALCIUM 9.4   PROT 7.5   ALBUMIN 3.9   BILITOT 0.3   ALKPHOS 65   AST 37   ALT 59*   ANIONGAP 10     Magnesium:   Recent Labs   Lab 07/11/24  1757   MG 1.8     Troponin:   Recent Labs   Lab 07/11/24  1519 07/11/24  1757   TROPONINI <0.006 <0.006     TSH:   Recent Labs   Lab 07/11/24  1519   TSH 0.554       Significant Imaging: I have reviewed all pertinent imaging results/findings within the past 24 hours.  EXAMINATION:  XR CHEST PA AND LATERAL     CLINICAL HISTORY:  Chest Pain;     TECHNIQUE:  PA and lateral views of the chest were performed.     COMPARISON:  Chest x-ray of January 9, 2024     FINDINGS:  The lungs are clear, with normal appearance of pulmonary vasculature and no pleural effusion or pneumothorax.     The cardiac silhouette is normal in size. The hilar and mediastinal contours are unremarkable.     Bones are intact.     Impression:     No acute abnormality.        Electronically signed by:Lance Mendez MD  Date:                                            07/11/2024  Time:                                           15:33  Assessment/Plan:     * Symptomatic anemia  Patient's anemia is currently worsening. Has received 2 units of PRBCs on 7/11/24 . Etiology likely d/t  Iron deficiency  Current CBC reviewed-   Lab Results   Component Value Date    HGB 6.9 (LL) 07/11/2024    HCT 27.5 (L) 07/11/2024     Monitor serial CBC and transfuse if patient becomes hemodynamically unstable, symptomatic or H/H drops below 7/21.    Hypokalemia  Patient has hypokalemia which is Acute and currently stable. Most recent potassium levels reviewed-   Lab Results   Component Value Date    K 3.3 (L) 07/11/2024   . Will continue potassium replacement per protocol and recheck repeat levels after replacement completed.       VTE Risk Mitigation (From admission, onward)           Ordered     IP VTE LOW RISK PATIENT  Once         07/11/24 1610     Place sequential compression device  Until discontinued         07/11/24 1610                         On 07/11/2024, patient should be placed in hospital observation services under my care in collaboration with Dr. Romelia Larios MD.           Jeanna Ghotra NP  Department of Hospital Medicine  [facility]

## 2024-07-12 NOTE — PLAN OF CARE
07/12/24 0828   Final Note   Assessment Type Final Discharge Note   Anticipated Discharge Disposition Left Against

## 2024-07-12 NOTE — SUBJECTIVE & OBJECTIVE
Past Medical History:   Diagnosis Date    Anemia, unspecified     Anxiety disorder, unspecified     Uterine fibroid        History reviewed. No pertinent surgical history.    Review of patient's allergies indicates:  No Known Allergies    No current facility-administered medications on file prior to encounter.     Current Outpatient Medications on File Prior to Encounter   Medication Sig    busPIRone (BUSPAR) 5 MG Tab Take 1 tablet (5 mg total) by mouth 2 (two) times daily. (Patient not taking: Reported on 7/11/2024)     Family History    None       Tobacco Use    Smoking status: Unknown    Smokeless tobacco: Not on file   Substance and Sexual Activity    Alcohol use: Yes    Drug use: Never    Sexual activity: Yes     Partners: Male     Review of Systems   Respiratory:  Positive for chest tightness and shortness of breath.    Psychiatric/Behavioral:  The patient is nervous/anxious.      Objective:     Vital Signs (Most Recent):  Temp: 98.9 °F (37.2 °C) (07/11/24 1745)  Pulse: 102 (07/11/24 1745)  Resp: 16 (07/11/24 1731)  BP: 136/81 (07/11/24 1745)  SpO2: 100 % (07/11/24 1745) Vital Signs (24h Range):  Temp:  [98.6 °F (37 °C)-98.9 °F (37.2 °C)] 98.9 °F (37.2 °C)  Pulse:  [] 102  Resp:  [16-20] 16  SpO2:  [100 %] 100 %  BP: (127-148)/(68-94) 136/81     Weight: 63.5 kg (140 lb)  Body mass index is 25.61 kg/m².     Physical Exam  Vitals reviewed.   Constitutional:       Appearance: Normal appearance.   HENT:      Head: Normocephalic and atraumatic.      Nose: Nose normal.      Mouth/Throat:      Mouth: Mucous membranes are moist.      Pharynx: Oropharynx is clear.   Eyes:      Extraocular Movements: Extraocular movements intact.      Pupils: Pupils are equal, round, and reactive to light.   Cardiovascular:      Rate and Rhythm: Normal rate and regular rhythm.      Pulses: Normal pulses.      Heart sounds: Normal heart sounds.   Pulmonary:      Effort: Pulmonary effort is normal.      Breath sounds: Normal breath  sounds.   Abdominal:      General: Bowel sounds are normal.      Palpations: Abdomen is soft.   Musculoskeletal:         General: Normal range of motion.      Cervical back: Normal range of motion and neck supple.   Skin:     General: Skin is warm and dry.      Capillary Refill: Capillary refill takes less than 2 seconds.      Coloration: Skin is pale.   Neurological:      General: No focal deficit present.      Mental Status: She is alert and oriented to person, place, and time. Mental status is at baseline.   Psychiatric:         Mood and Affect: Mood normal.         Behavior: Behavior normal.         Thought Content: Thought content normal.         Judgment: Judgment normal.              CRANIAL NERVES     CN III, IV, VI   Pupils are equal, round, and reactive to light.       Significant Labs: All pertinent labs within the past 24 hours have been reviewed.  CBC:   Recent Labs   Lab 07/11/24  1519   WBC 4.54   HGB 6.9*   HCT 27.5*        CMP:   Recent Labs   Lab 07/11/24  1519      K 3.3*      CO2 21*   *   BUN 10   CREATININE 0.8   CALCIUM 9.4   PROT 7.5   ALBUMIN 3.9   BILITOT 0.3   ALKPHOS 65   AST 37   ALT 59*   ANIONGAP 10     Magnesium:   Recent Labs   Lab 07/11/24  1757   MG 1.8     Troponin:   Recent Labs   Lab 07/11/24  1519 07/11/24  1757   TROPONINI <0.006 <0.006     TSH:   Recent Labs   Lab 07/11/24  1519   TSH 0.554       Significant Imaging: I have reviewed all pertinent imaging results/findings within the past 24 hours.  EXAMINATION:  XR CHEST PA AND LATERAL     CLINICAL HISTORY:  Chest Pain;     TECHNIQUE:  PA and lateral views of the chest were performed.     COMPARISON:  Chest x-ray of January 9, 2024     FINDINGS:  The lungs are clear, with normal appearance of pulmonary vasculature and no pleural effusion or pneumothorax.     The cardiac silhouette is normal in size. The hilar and mediastinal contours are unremarkable.     Bones are intact.     Impression:     No acute  abnormality.        Electronically signed by:Lance Mendez MD  Date:                                            07/11/2024  Time:                                           15:33

## 2024-07-12 NOTE — HOSPITAL COURSE
S observed by the hospital medicine service for symptomatic anemia.  She was transfused 2 units PRBCs with subsequent hemoglobin 9.7.  Her symptoms improved.  She needed to leave for work so signed out AMA prior to my rounds on 07/12.  Because of this I was unable to fully examine, discuss with,  her.

## 2024-07-12 NOTE — HPI
Duy Cisneros is a 39 year old female with a history of iron defiency anemia, anxiety, uterine fibroids, and possible sickle cell seen by Dr. Beltran who presented to the emergency room for episode of anxiety and shortness of breath. The patient endorses she is under a lot of stress currently. Initial ED work up revealed a hemoglobin of 6.9 and hematocrit of 27.5. She reports that she has not been compliant with iron supplementation due to it causing constipation and dark stools. Also, has not been supplementing with vitamin B12 due to lapse in insurance and unable to obtain prescription. She was also unable to follow-up outpatient with Dr. Beltran for further analysis of her anemia and possible sickle cell due to lack of insurance. She denies any chest pain or shortness of breath currently. Troponin negative x2, EKG showed no ST elevation and chest xray showed no acute process. Patient transfused two units total of PRBC's and admitted by hospital medicine for further evaluation and management.

## 2024-07-12 NOTE — DISCHARGE SUMMARY
Counts include 234 beds at the [facility] Medicine  Discharge Summary      Patient Name: Duy Cisneros  MRN: [id number]  EDGARDO: [id number]  Patient Class: OP- Observation  Admission Date: 7/11/2024  Hospital Length of Stay: 0 days  Discharge Date and Time: 7/12/2024  8:28 AM  Attending Physician: Dasia att. providers found   Discharging Provider: Romelia Larios MD  Primary Care Provider: Dasia, Primary Doctor    Primary Care Team: Networked reference to record PCT     HPI:   Duy Cisneros is a 39 year old female with a history of iron defiency anemia, anxiety, uterine fibroids, and possible sickle cell seen by Dr. Beltran who presented to the emergency room for episode of anxiety and shortness of breath. The patient endorses she is under a lot of stress currently. Initial ED work up revealed a hemoglobin of 6.9 and hematocrit of 27.5. She reports that she has not been compliant with iron supplementation due to it causing constipation and dark stools. Also, has not been supplementing with vitamin B12 due to lapse in insurance and unable to obtain prescription. She was also unable to follow-up outpatient with Dr. Beltran for further analysis of her anemia and possible sickle cell due to lack of insurance. She denies any chest pain or shortness of breath currently. Troponin negative x2, EKG showed no ST elevation and chest xray showed no acute process. Patient transfused two units total of PRBC's and admitted by hospital medicine for further evaluation and management.     * No surgery found *      Hospital Course:   S observed by the hospital medicine service for symptomatic anemia.  She was transfused 2 units PRBCs with subsequent hemoglobin 9.7.  Her symptoms improved.  She needed to leave for work so signed out AMA prior to my rounds on 07/12.  Because of this I was unable to fully examine, discuss with,  her.     Goals of Care Treatment Preferences:  Code Status: Full Code      Consults:   Consults (From  admission, onward)          Status Ordering Provider     Case Management/  Once        Provider:  (Not yet assigned)    NEGIN Garrison            No new Assessment & Plan notes have been filed under this hospital service since the last note was generated.  Service: Hospital Medicine    Final Active Diagnoses:    Diagnosis Date Noted POA    PRINCIPAL PROBLEM:  Symptomatic anemia [D64.9] 01/09/2024 Yes    Hypokalemia [E87.6] 07/11/2024 Yes      Problems Resolved During this Admission:       Discharged Condition: against medical advice    Disposition: Home or Self Care    Follow Up:    Patient Instructions:   No discharge procedures on file.    Significant Diagnostic Studies: Labs: BMP:   Recent Labs   Lab 07/11/24  1519 07/11/24  1757 07/12/24  0300   *  --  122*     --  137   K 3.3*  --  3.9     --  106   CO2 21*  --  23   BUN 10  --  9   CREATININE 0.8  --  0.8   CALCIUM 9.4  --  9.1   MG  --  1.8  --     and CBC   Recent Labs   Lab 07/11/24  1519 07/12/24  0300   WBC 4.54 7.25   HGB 6.9* 9.7*   HCT 27.5* 33.0*    213       Pending Diagnostic Studies:       None           Medications:  Left AMA    Indwelling Lines/Drains at time of discharge:   Lines/Drains/Airways       None                   Time spent on the discharge of patient: 32 minutes         Romelia Larios MD  Department of Hospital Medicine  [facility]

## 2024-07-19 LAB
OHS QRS DURATION: 78 MS
OHS QTC CALCULATION: 459 MS

## 2025-08-23 ENCOUNTER — HOSPITAL ENCOUNTER (OUTPATIENT)
Facility: HOSPITAL | Age: 40
Discharge: HOME | End: 2025-08-24
Attending: STUDENT IN AN ORGANIZED HEALTH CARE EDUCATION/TRAINING PROGRAM | Admitting: HOSPITALIST
Payer: COMMERCIAL

## 2025-08-23 PROBLEM — R07.9 CHEST PAIN: Status: ACTIVE | Noted: 2025-08-23

## 2025-08-23 PROBLEM — D25.9 UTERINE FIBROID: Status: ACTIVE | Noted: 2025-08-23

## 2025-08-27 ENCOUNTER — CLINICAL SUPPORT (OUTPATIENT)
Dept: REHABILITATION | Facility: HOSPITAL | Age: 40
End: 2025-08-27
Attending: INTERNAL MEDICINE
Payer: COMMERCIAL

## 2025-08-27 DIAGNOSIS — M25.512 PAIN IN JOINT OF LEFT SHOULDER: Primary | ICD-10-CM

## 2025-08-27 PROCEDURE — 97161 PT EVAL LOW COMPLEX 20 MIN: CPT | Mod: PO

## 2025-08-27 PROCEDURE — 97110 THERAPEUTIC EXERCISES: CPT | Mod: PO

## 2025-08-28 ENCOUNTER — PATIENT OUTREACH (OUTPATIENT)
Dept: ADMINISTRATIVE | Facility: CLINIC | Age: 40
End: 2025-08-28
Payer: COMMERCIAL

## 2025-08-28 DIAGNOSIS — D25.9 LEIOMYOMA OF UTERUS, UNSPECIFIED: ICD-10-CM

## 2025-08-28 DIAGNOSIS — N93.9 HEMORRHAGE IN UTERUS: Primary | ICD-10-CM

## 2025-09-05 ENCOUNTER — HOSPITAL ENCOUNTER (OUTPATIENT)
Dept: RADIOLOGY | Facility: HOSPITAL | Age: 40
Discharge: HOME | End: 2025-09-05
Attending: STUDENT IN AN ORGANIZED HEALTH CARE EDUCATION/TRAINING PROGRAM
Payer: COMMERCIAL

## 2025-09-05 DIAGNOSIS — N93.9 HEMORRHAGE IN UTERUS: ICD-10-CM

## 2025-09-05 DIAGNOSIS — D25.9 LEIOMYOMA OF UTERUS, UNSPECIFIED: ICD-10-CM

## 2025-09-05 PROCEDURE — A9585 GADOBUTROL INJECTION: HCPCS

## 2025-09-05 PROCEDURE — 72197 MRI PELVIS W/O & W/DYE: CPT | Mod: 26,,, | Performed by: RADIOLOGY

## 2025-09-05 PROCEDURE — 72197 MRI PELVIS W/O & W/DYE: CPT | Mod: TC

## 2025-09-05 PROCEDURE — 25500020 PHARM REV CODE 255

## 2025-09-05 RX ORDER — GADOBUTROL 604.72 MG/ML
INJECTION INTRAVENOUS
Status: COMPLETED
Start: 2025-09-05 | End: 2025-09-05

## 2025-09-05 RX ADMIN — GADOBUTROL 6 ML: 604.72 INJECTION INTRAVENOUS at 09:09
